# Patient Record
Sex: FEMALE | Race: WHITE | NOT HISPANIC OR LATINO | Employment: FULL TIME | URBAN - NONMETROPOLITAN AREA
[De-identification: names, ages, dates, MRNs, and addresses within clinical notes are randomized per-mention and may not be internally consistent; named-entity substitution may affect disease eponyms.]

---

## 2017-04-19 ENCOUNTER — OFFICE VISIT (OUTPATIENT)
Dept: OBSTETRICS AND GYNECOLOGY | Facility: CLINIC | Age: 57
End: 2017-04-19

## 2017-04-19 VITALS
WEIGHT: 142 LBS | SYSTOLIC BLOOD PRESSURE: 102 MMHG | HEIGHT: 63 IN | DIASTOLIC BLOOD PRESSURE: 70 MMHG | BODY MASS INDEX: 25.16 KG/M2

## 2017-04-19 DIAGNOSIS — Z01.419 VISIT FOR GYNECOLOGIC EXAMINATION: Primary | ICD-10-CM

## 2017-04-19 DIAGNOSIS — E55.9 VITAMIN D DEFICIENCY: ICD-10-CM

## 2017-04-19 DIAGNOSIS — Z12.39 SCREENING FOR BREAST CANCER: ICD-10-CM

## 2017-04-19 DIAGNOSIS — N93.8 DUB (DYSFUNCTIONAL UTERINE BLEEDING): ICD-10-CM

## 2017-04-19 PROCEDURE — 99386 PREV VISIT NEW AGE 40-64: CPT | Performed by: NURSE PRACTITIONER

## 2017-04-19 PROCEDURE — G0123 SCREEN CERV/VAG THIN LAYER: HCPCS | Performed by: NURSE PRACTITIONER

## 2017-04-19 NOTE — PATIENT INSTRUCTIONS
Bone Health  Bones protect organs, store calcium, and anchor muscles. Good health habits, such as eating nutritious foods and exercising regularly, are important for maintaining healthy bones. They can also help to prevent a condition that causes bones to lose density and become weak and brittle (osteoporosis).  WHY IS BONE MASS IMPORTANT?  Bone mass refers to the amount of bone tissue that you have. The higher your bone mass, the stronger your bones. An important step toward having healthy bones throughout life is to have strong and dense bones during childhood. A young adult who has a high bone mass is more likely to have a high bone mass later in life. Bone mass at its greatest it is called peak bone mass.  A large decline in bone mass occurs in older adults. In women, it occurs about the time of menopause. During this time, it is important to practice good health habits, because if more bone is lost than what is replaced, the bones will become less healthy and more likely to break (fracture). If you find that you have a low bone mass, you may be able to prevent osteoporosis or further bone loss by changing your diet and lifestyle.  HOW CAN I FIND OUT IF MY BONE MASS IS LOW?  Bone mass can be measured with an X-ray test that is called a bone mineral density (BMD) test. This test is recommended for all women who are age 65 or older. It may also be recommended for men who are age 70 or older, or for people who are more likely to develop osteoporosis due to:  · Having bones that break easily.  · Having a long-term disease that weakens bones, such as kidney disease or rheumatoid arthritis.  · Having menopause earlier than normal.  · Taking medicine that weakens bones, such as steroids, thyroid hormones, or hormone treatment for breast cancer or prostate cancer.  · Smoking.  · Drinking three or more alcoholic drinks each day.  WHAT ARE THE NUTRITIONAL RECOMMENDATIONS FOR HEALTHY BONES?  To have healthy bones, you need  to get enough of the right minerals and vitamins. Most nutrition experts recommend getting these nutrients from the foods that you eat. Nutritional recommendations vary from person to person. Ask your health care provider what is healthy for you. Here are some general guidelines.  Calcium Recommendations  Calcium is the most important (essential) mineral for bone health. Most people can get enough calcium from their diet, but supplements may be recommended for people who are at risk for osteoporosis. Good sources of calcium include:  · Dairy products, such as low-fat or nonfat milk, cheese, and yogurt.  · Dark green leafy vegetables, such as bok terri and broccoli.  · Calcium-fortified foods, such as orange juice, cereal, bread, soy beverages, and tofu products.  · Nuts, such as almonds.  Follow these recommended amounts for daily calcium intake:  · Children, age 1-3: 700 mg.  · Children, age 4-8: 1,000 mg.  · Children, age 9-13: 1,300 mg.  · Teens, age 14-18: 1,300 mg.  · Adults, age 19-50: 1,000 mg.  · Adults, age 51-70:    Men: 1,000 mg.    Women: 1,200 mg.  · Adults, age 71 or older: 1,200 mg.  · Pregnant and breastfeeding females:    Teens: 1,300 mg.    Adults: 1,000 mg.  Vitamin D Recommendations  Vitamin D is the most essential vitamin for bone health. It helps the body to absorb calcium. Sunlight stimulates the skin to make vitamin D, so be sure to get enough sunlight. If you live in a cold climate or you do not get outside often, your health care provider may recommend that you take vitamin D supplements. Good sources of vitamin D in your diet include:  · Egg yolks.  · Saltwater fish.  · Milk and cereal fortified with vitamin D.  Follow these recommended amounts for daily vitamin D intake:  · Children and teens, age 1-18: 600 international units.  · Adults, age 50 or younger: 400-800 international units.  · Adults, age 51 or older: 800-1,000 international units.  Other Nutrients  Other nutrients for bone  health include:  · Phosphorus. This mineral is found in meat, poultry, dairy foods, nuts, and legumes. The recommended daily intake for adult men and adult women is 700 mg.  · Magnesium. This mineral is found in seeds, nuts, dark green vegetables, and legumes. The recommended daily intake for adult men is 400-420 mg. For adult women, it is 310-320 mg.  · Vitamin K. This vitamin is found in green leafy vegetables. The recommended daily intake is 120 mg for adult men and 90 mg for adult women.  WHAT TYPE OF PHYSICAL ACTIVITY IS BEST FOR BUILDING AND MAINTAINING HEALTHY BONES?  Weight-bearing and strength-building activities are important for building and maintaining peak bone mass. Weight-bearing activities cause muscles and bones to work against gravity. Strength-building activities increases muscle strength that supports bones. Weight-bearing and muscle-building activities include:  · Walking and hiking.  · Jogging and running.  · Dancing.  · Gym exercises.  · Lifting weights.  · Tennis and racquetball.  · Climbing stairs.  · Aerobics.  Adults should get at least 30 minutes of moderate physical activity on most days. Children should get at least 60 minutes of moderate physical activity on most days. Ask your health care provide what type of exercise is best for you.  WHERE CAN I FIND MORE INFORMATION?  For more information, check out the following websites:  · National Osteoporosis Foundation: http://nof.org/learn/basics  · National Institutes of Health: http://www.niams.nih.gov/Health_Info/Bone/Bone_Health/bone_health_for_life.asp     This information is not intended to replace advice given to you by your health care provider. Make sure you discuss any questions you have with your health care provider.     Document Released: 03/09/2005 Document Revised: 05/03/2016 Document Reviewed: 12/23/2015  [employer] Patient Education ©2016 [employer]

## 2017-04-19 NOTE — PROGRESS NOTES
Subjective   Kyleigh James is a 56 y.o. female is here today as a self referral.    HPI Comments: I'm here for a pap and physical and to see why I had an episode of irregular bleeding last month.    Gynecologic Exam   The patient's pertinent negatives include no pelvic pain or vaginal discharge. Pertinent negatives include no abdominal pain, back pain, chills, constipation, diarrhea, flank pain, headaches, nausea, rash, sore throat or vomiting.       The following portions of the patient's history were reviewed and updated as appropriate: allergies, current medications, past family history, past medical history, past surgical history and problem list.    Review of Systems   Constitutional: Negative for activity change, appetite change, chills and fatigue.   HENT: Negative for congestion, dental problem, ear pain, hearing loss, nosebleeds, rhinorrhea, sneezing, sore throat and voice change.    Eyes: Negative for discharge, redness, itching and visual disturbance.   Respiratory: Negative for apnea, cough, choking, shortness of breath and wheezing.    Cardiovascular: Negative for chest pain and palpitations.   Gastrointestinal: Negative for abdominal distention, abdominal pain, constipation, diarrhea, nausea and vomiting.   Endocrine: Negative for cold intolerance, heat intolerance and polyuria.   Genitourinary: Positive for vaginal bleeding (one time of irregular bleeding after my period). Negative for difficulty urinating, dyspareunia, flank pain, genital sores, menstrual problem, pelvic pain and vaginal discharge.   Musculoskeletal: Negative for arthralgias, back pain, myalgias and neck pain.   Skin: Negative for pallor and rash.   Allergic/Immunologic: Negative for environmental allergies and food allergies.   Neurological: Negative for dizziness, tremors, seizures, numbness and headaches.   Hematological: Negative for adenopathy. Does not bruise/bleed easily.   Psychiatric/Behavioral: Negative for agitation,  decreased concentration, sleep disturbance and suicidal ideas. The patient is not nervous/anxious.        Objective   Physical Exam   Constitutional: She is oriented to person, place, and time. She appears well-developed and well-nourished. No distress.   HENT:   Head: Normocephalic and atraumatic.   Right Ear: External ear normal.   Left Ear: External ear normal.   Nose: Nose normal.   Mouth/Throat: Oropharynx is clear and moist.   Eyes: EOM are normal. Pupils are equal, round, and reactive to light.   Neck: Normal range of motion. No thyromegaly present.   Cardiovascular: Normal rate, regular rhythm and normal heart sounds.    No murmur heard.  Pulmonary/Chest: Effort normal and breath sounds normal. Bradypneic: bila. No respiratory distress. She has no wheezes. Right breast exhibits no inverted nipple and no mass. Left breast exhibits no inverted nipple and no mass (b). Breasts are asymmetrical.   Bilateral implants, asymmetrical Lt and Rt breasts due to implants encapsulated     Abdominal: Soft. Bowel sounds are normal. There is no tenderness.   Genitourinary: Vagina normal and uterus normal. No breast tenderness. Pelvic exam was performed with patient supine. There is no rash or tenderness on the right labia. There is no rash or tenderness on the left labia. Cervix exhibits no motion tenderness. Right adnexum displays no mass and no tenderness. Left adnexum displays no mass and no tenderness. No bleeding in the vagina. No vaginal discharge found.   Genitourinary Comments: Urethra and urethral meatus normal  Bladder normal no prolapse  Perineum and rectum examined and intact without lesions   Musculoskeletal: Normal range of motion.   Lymphadenopathy:        Right: No inguinal adenopathy present.        Left: No inguinal adenopathy present.   Neurological: She is alert and oriented to person, place, and time. She has normal reflexes.   Skin: Skin is warm and dry.   Psychiatric: She has a normal mood and affect.  Her behavior is normal. Judgment and thought content normal.   Nursing note and vitals reviewed.        Assessment/Plan   Kyleigh was seen today for gynecologic exam.    Diagnoses and all orders for this visit:    Visit for gynecologic examination     Normal gyn exam. Pt does not have a PCP and she does understand if any of the labs are abnormal we will help her find one  -     Liquid-based Pap Smear, Screening  -     T4, Free  -     Hemoglobin A1c  -     CBC & Differential  -     Comprehensive Metabolic Panel  -     Lipid Panel With LDL / HDL Ratio  -     TSH    Screening for breast cancer     Pt has not had one in many years. Bilateral implants encapsulated.   -     Mammo Screening Digital Tomosynthesis Bilateral With CAD; Future    DUB (dysfunctional uterine bleeding)    Pt still has regular periods every 28 days. She had one episode of irregular bleeding after her LMP  Will get an u/s to evaluate     Vitamin D deficiency    Pt had a previous deficiency a long time ago.    -     Vitamin D 25 Hydroxy    BMI 25.0-25.9,adult     Pt does not exercise but does watch what she eats.

## 2017-04-21 LAB
GEN CATEG CVX/VAG CYTO-IMP: NORMAL
LAB AP CASE REPORT: NORMAL
LAB AP GYN ADDITIONAL INFORMATION: NORMAL
LAB AP GYN OTHER FINDINGS: NORMAL
Lab: NORMAL
PATH INTERP SPEC-IMP: NORMAL
STAT OF ADQ CVX/VAG CYTO-IMP: NORMAL

## 2017-04-24 ENCOUNTER — TELEPHONE (OUTPATIENT)
Dept: OBSTETRICS AND GYNECOLOGY | Facility: CLINIC | Age: 57
End: 2017-04-24

## 2017-04-24 NOTE — TELEPHONE ENCOUNTER
----- Message from Mary R Carrell, APRN sent at 4/21/2017  5:39 PM CDT -----  Notify pt of her normal pap smear      Left message on patients voice mail to return my call  PP

## 2017-04-25 ENCOUNTER — PROCEDURE VISIT (OUTPATIENT)
Dept: OBSTETRICS AND GYNECOLOGY | Facility: CLINIC | Age: 57
End: 2017-04-25

## 2017-04-25 ENCOUNTER — TELEPHONE (OUTPATIENT)
Dept: OBSTETRICS AND GYNECOLOGY | Facility: CLINIC | Age: 57
End: 2017-04-25

## 2017-04-25 DIAGNOSIS — N93.8 DUB (DYSFUNCTIONAL UTERINE BLEEDING): Primary | ICD-10-CM

## 2017-04-25 PROCEDURE — 76830 TRANSVAGINAL US NON-OB: CPT | Performed by: OBSTETRICS & GYNECOLOGY

## 2017-05-12 LAB
25(OH)D3+25(OH)D2 SERPL-MCNC: 31.7 NG/ML (ref 30–100)
ALBUMIN SERPL-MCNC: 4.1 G/DL (ref 3.5–5)
ALBUMIN/GLOB SERPL: 1.5 G/DL (ref 1.1–2.5)
ALP SERPL-CCNC: 50 U/L (ref 24–120)
ALT SERPL-CCNC: 30 U/L (ref 0–54)
AST SERPL-CCNC: 32 U/L (ref 7–45)
BASOPHILS # BLD AUTO: 0.01 10*3/MM3 (ref 0–0.2)
BASOPHILS NFR BLD AUTO: 0.3 % (ref 0–2)
BILIRUB SERPL-MCNC: 0.5 MG/DL (ref 0.1–1)
BUN SERPL-MCNC: 11 MG/DL (ref 5–21)
BUN/CREAT SERPL: 14.9 (ref 7–25)
CALCIUM SERPL-MCNC: 9.6 MG/DL (ref 8.4–10.4)
CHLORIDE SERPL-SCNC: 102 MMOL/L (ref 98–110)
CHOLEST SERPL-MCNC: 181 MG/DL (ref 130–200)
CO2 SERPL-SCNC: 28 MMOL/L (ref 24–31)
CREAT SERPL-MCNC: 0.74 MG/DL (ref 0.5–1.4)
EOSINOPHIL # BLD AUTO: 0.08 10*3/MM3 (ref 0–0.7)
EOSINOPHIL NFR BLD AUTO: 2.1 % (ref 0–4)
ERYTHROCYTE [DISTWIDTH] IN BLOOD BY AUTOMATED COUNT: 14.7 % (ref 12–15)
GLOBULIN SER CALC-MCNC: 2.8 GM/DL
GLUCOSE SERPL-MCNC: 78 MG/DL (ref 70–100)
HBA1C MFR BLD: 5.1 %
HCT VFR BLD AUTO: 39.3 % (ref 37–47)
HDLC SERPL-MCNC: 72 MG/DL
HGB BLD-MCNC: 13.1 G/DL (ref 12–16)
IMM GRANULOCYTES # BLD: 0.01 10*3/MM3 (ref 0–0.03)
IMM GRANULOCYTES NFR BLD: 0.3 % (ref 0–5)
LDLC SERPL CALC-MCNC: 99 MG/DL (ref 0–99)
LDLC/HDLC SERPL: 1.37 {RATIO}
LYMPHOCYTES # BLD AUTO: 1.02 10*3/MM3 (ref 0.72–4.86)
LYMPHOCYTES NFR BLD AUTO: 27.1 % (ref 15–45)
MCH RBC QN AUTO: 28.7 PG (ref 28–32)
MCHC RBC AUTO-ENTMCNC: 33.3 G/DL (ref 33–36)
MCV RBC AUTO: 86 FL (ref 82–98)
MONOCYTES # BLD AUTO: 0.45 10*3/MM3 (ref 0.19–1.3)
MONOCYTES NFR BLD AUTO: 12 % (ref 4–12)
NEUTROPHILS # BLD AUTO: 2.19 10*3/MM3 (ref 1.87–8.4)
NEUTROPHILS NFR BLD AUTO: 58.2 % (ref 39–78)
PLATELET # BLD AUTO: 214 10*3/MM3 (ref 130–400)
POTASSIUM SERPL-SCNC: 5.2 MMOL/L (ref 3.5–5.3)
PROT SERPL-MCNC: 6.9 G/DL (ref 6.3–8.7)
RBC # BLD AUTO: 4.57 10*6/MM3 (ref 4.2–5.4)
SODIUM SERPL-SCNC: 141 MMOL/L (ref 135–145)
T4 FREE SERPL-MCNC: 1.17 NG/DL (ref 0.78–2.19)
TRIGL SERPL-MCNC: 51 MG/DL (ref 0–149)
TSH SERPL DL<=0.005 MIU/L-ACNC: 2 MIU/ML (ref 0.47–4.68)
VLDLC SERPL CALC-MCNC: 10.2 MG/DL
WBC # BLD AUTO: 3.76 10*3/MM3 (ref 4.8–10.8)

## 2017-05-18 ENCOUNTER — HOSPITAL ENCOUNTER (OUTPATIENT)
Dept: MAMMOGRAPHY | Facility: HOSPITAL | Age: 57
Discharge: HOME | End: 2017-05-18
Admitting: NURSE PRACTITIONER

## 2017-05-18 DIAGNOSIS — Z12.39 SCREENING FOR BREAST CANCER: ICD-10-CM

## 2017-05-18 PROCEDURE — 77063 BREAST TOMOSYNTHESIS BI: CPT

## 2017-05-18 PROCEDURE — G0202 SCR MAMMO BI INCL CAD: HCPCS

## 2017-06-08 ENCOUNTER — OFFICE VISIT (OUTPATIENT)
Dept: OBSTETRICS AND GYNECOLOGY | Facility: CLINIC | Age: 57
End: 2017-06-08

## 2017-06-08 VITALS
SYSTOLIC BLOOD PRESSURE: 136 MMHG | WEIGHT: 129 LBS | BODY MASS INDEX: 22.86 KG/M2 | HEIGHT: 63 IN | DIASTOLIC BLOOD PRESSURE: 66 MMHG

## 2017-06-08 DIAGNOSIS — N97.0 ANOVULATORY (DYSFUNCTIONAL UTERINE) BLEEDING: Primary | ICD-10-CM

## 2017-06-08 PROCEDURE — 99213 OFFICE O/P EST LOW 20 MIN: CPT | Performed by: OBSTETRICS & GYNECOLOGY

## 2017-06-08 NOTE — PROGRESS NOTES
"Kyleigh James is a 56 y.o. female here today for evaluation of perimenopausal irregular bleeding.  She reports normal regular menstrual cycles until March.  She had a menstrual cycle the end of March, and then began bleeding 2 weeks later.  That bleeding episode lasted 4 days and then she did not have any bleeding for 6 weeks.  Her last normal menstrual period was June 1 and just ended.  It lasted about I days and was normal for her.  She denies any abdominal pain or nausea.  She has not had any hot flashes or night sweats.  She had an ultrasound in April which showed a normal size uterus with a couple of fibroids measuring about 1 cm. Her TSH and CBC in May were normal. Her Pap smear in April was normal.    Visit Vitals   • /66 (BP Location: Right arm, Patient Position: Sitting, Cuff Size: Adult)   • Ht 63\" (160 cm)   • Wt 129 lb (58.5 kg)   • LMP 06/01/2017 (Exact Date)   • Breastfeeding No   • BMI 22.85 kg/m2     Pleasant female no acute distress  Skin without pallor  Insight and judgment normal    Assessment: Perimenopausal dysfunctional bleeding    We discussed the normal menopausal transition and the role of ovulation in regular cycles.  I do not see any concerning findings on her ultrasound and her lab work is normal.  We discussed treatment options including expectant management or using cyclic Provera to ensure regularity of her cycle.  She would like to proceed with expectant management and she will call the office if she develops any persistent heavy bleeding.      "

## 2018-07-26 ENCOUNTER — OFFICE VISIT (OUTPATIENT)
Dept: RETAIL CLINIC | Facility: CLINIC | Age: 58
End: 2018-07-26

## 2018-07-26 VITALS
DIASTOLIC BLOOD PRESSURE: 68 MMHG | OXYGEN SATURATION: 98 % | SYSTOLIC BLOOD PRESSURE: 128 MMHG | HEIGHT: 64 IN | HEART RATE: 86 BPM | BODY MASS INDEX: 22.4 KG/M2 | WEIGHT: 131.2 LBS | TEMPERATURE: 98.3 F | RESPIRATION RATE: 18 BRPM

## 2018-07-26 DIAGNOSIS — L08.9 WOUND INFECTION: Primary | ICD-10-CM

## 2018-07-26 DIAGNOSIS — T14.8XXA WOUND INFECTION: Primary | ICD-10-CM

## 2018-07-26 PROCEDURE — 99213 OFFICE O/P EST LOW 20 MIN: CPT | Performed by: NURSE PRACTITIONER

## 2018-07-26 RX ORDER — CLINDAMYCIN HYDROCHLORIDE 300 MG/1
300 CAPSULE ORAL 3 TIMES DAILY
Qty: 30 CAPSULE | Refills: 0 | Status: SHIPPED | OUTPATIENT
Start: 2018-07-26 | End: 2018-08-05

## 2018-07-26 NOTE — PROGRESS NOTES
"  Chief Complaint   Patient presents with   • Wound Infection     Subjective   Kyleigh James is a 57 y.o. female who presents to the clinic today with complaints of wound infection. She hit her right lower leg with the door to her  eight days ago. The wound has been slow to heal and now has redness and is more painful. She has been applying polysporin. She has also used peroxide and betadine.   HPI      Current Outpatient Prescriptions:   •  Multiple Vitamins-Minerals (MULTIVITAMIN ADULT PO), Take 1 tablet by mouth Daily., Disp: , Rfl:     Allergies:  Penicillins    History reviewed. No pertinent past medical history.  Past Surgical History:   Procedure Laterality Date   • AUGMENTATION MAMMAPLASTY Bilateral 30 yrs   • EYE SURGERY       Family History   Problem Relation Age of Onset   • Cancer Mother    • Breast cancer Mother    • Diabetes Maternal Grandmother      Social History   Substance Use Topics   • Smoking status: Never Smoker   • Smokeless tobacco: Never Used   • Alcohol use No       Review of Systems  Review of Systems   Constitutional: Negative for chills, fatigue and fever.   Respiratory: Negative for cough and shortness of breath.    Cardiovascular: Positive for leg swelling (chronic off and on due to \"poor circulation\").   Musculoskeletal: Negative.    Skin: Positive for wound.       Objective   /68 (BP Location: Left arm, Patient Position: Sitting, Cuff Size: Adult)   Pulse 86   Temp 98.3 °F (36.8 °C) (Oral)   Resp 18   Ht 162.6 cm (64\")   Wt 59.5 kg (131 lb 3.2 oz)   LMP 07/26/2018   SpO2 98%   BMI 22.52 kg/m²       Physical Exam   Constitutional: She is oriented to person, place, and time. She appears well-developed and well-nourished. No distress.   Cardiovascular: Normal rate, regular rhythm and normal heart sounds.    Pulmonary/Chest: Effort normal and breath sounds normal.   Neurological: She is alert and oriented to person, place, and time.   Skin: Skin is warm and dry. "        Psychiatric: She has a normal mood and affect.     Wound cleansed with commercial wound cleanser. Triple antibacterial ointment and bandaid applied.    Assessment/Plan     Kyleigh was seen today for wound infection.    Diagnoses and all orders for this visit:    Wound infection    Other orders  -     clindamycin (CLEOCIN) 300 MG capsule; Take 1 capsule by mouth 3 (Three) Times a Day for 10 days.      Wash with soap and water. Pat dry and apply triple antibiotic ointment. Do not use peroxide or betadine.  Cover with bandage.  Elevate leg when possible.     Monitor wound.  If no improvement after three days please follow up with your primary care provider or at urgent care.  If worse please be seen sooner.

## 2018-07-26 NOTE — PATIENT INSTRUCTIONS
Wound Infection  A wound infection happens when germs start to grow in the wound. Germs that cause wound infections are most commonly bacteria. Other types of infections can occur as well. In some cases, infection can cause the wound to break open. Wound infections need treatment. If a wound infection is left untreated, complications can occur. This may include an infection in your bloodstream (sepsis) or in a bone (osteomyelitis).  What are the causes?  This condition is caused by germs growing in the wound.  What increases the risk?  The following factors may make you more likely to develop this condition:  · Having a weak body defense system (immune system).  · Having diabetes.  · Taking steroid medicines for a long time (chronic use).  · Smoking.  · Older age.  · Being overweight.    What are the signs or symptoms?  Symptoms of this condition include:  · Having more redness, swelling, or pain at the wound site.  · Having more blood, pus, or fluid at the wound site.  · A bad smell coming from a wound or bandage (dressing).  · Having a fever.  · Feeling tired or fatigued.    How is this diagnosed?  This condition is diagnosed with a medical history and physical exam. You may also have blood tests.  How is this treated?  This condition is treated with an antibiotic medicine. The infection should improve 24-48 hours after you start antibiotics. Any redness around the wound should stop spreading, and the wound should be less painful.  Follow these instructions at home:  Medicines  · Take or apply over-the-counter and prescription medicines only as told by your health care provider.  · If you were prescribed antibiotic medicine, take or apply it as told by your health care provider. Do not stop using the antibiotic even if your condition improves.  Wound care  · Clean the wound each day or as told by your health care provider.  ? Wash the wound with mild soap and water.  ? Rinse the wound with water to remove all  soap.  ? Pat the wound dry with a clean towel. Do not rub it.  · Follow instructions from your health care provider about how to take care of your wound. Make sure you:  ? Wash your hands with soap and water before you change your dressing. If soap and water are not available, use hand .  ? Change your dressing as told by your health care provider.  ? Leave stitches (sutures), skin glue, or adhesive strips in place, if this applies. These skin closures may need to stay in place for 2 weeks or longer. If adhesive strip edges start to loosen and curl up, you may trim the loose edges. Do not remove adhesive strips completely unless your health care provider tells you to do that. Some wounds are left open to heal on their own.  · Check your wound every day for signs of infection. Watch for:  ? More redness, swelling, or pain.  ? More fluid or blood.  ? Warmth.  ? Pus or a bad smell.  General instructions  · Keep the dressing dry until your health care provider says it can be removed.  · Do not take baths, swim, use a hot tub, or do anything that would put your wound underwater until your health care provider approves.  · Raise (elevate) the injured area above the level of your heart while you are sitting or lying down.  · Do not scratch or pick at the wound.  · Keep all follow-up visits as told by your health care provider. This is important.  Contact a health care provider if:  · Your pain is not controlled with medicine.  · You have more redness, swelling, or pain around your wound.  · You have more fluid or blood coming from your wound.  · Your wound feels warm to the touch.  · You have pus coming from your wound.  · You continue to notice a bad smell coming from your wound or your dressing.  · Your wound that was closed breaks open.  Get help right away if:  · You have a red streak going away from your wound.  · You have a fever.  This information is not intended to replace advice given to you by your  health care provider. Make sure you discuss any questions you have with your health care provider.  Document Released: 09/15/2004 Document Revised: 05/31/2017 Document Reviewed: 06/06/2016  [employer] Interactive Patient Education © 2018 [employer]    Wash with soap and water. Pat dry and apply triple antibiotic ointment. Do not use peroxide or betadine.  Cover with bandage.  Elevate leg when possible.     Monitor wound.  If no improvement after three days please follow up with your primary care provider or at urgent care.  If worse please be seen sooner.

## 2018-07-30 ENCOUNTER — DOCUMENTATION (OUTPATIENT)
Dept: RETAIL CLINIC | Facility: CLINIC | Age: 58
End: 2018-07-30

## 2018-07-30 NOTE — PROGRESS NOTES
She stopped by while at [employer] because she felt area of redness around right shin wound was worse today.  Has been on clindamycin for 4 days.  No fever. Wound looks moist, and she has irritation from bandage.  There is minimal erythema around two small (less than 1/2 cm) wounds. She showed me pictures of original wound/injury, and the scraped areas look much smaller, as does the area of erythema. I told her to continue to cleanse with soap and water, pat dry, and leave open to air as much as possible.  No more ointment.  Daniel erythema with ballpoint pen, and if it has spread tomorrow or if fever develops, she needs to go to UC or ED.  Otherwise, F/U in 3 days, at which point she will have been on clindamycin for 7 days.

## 2018-11-19 ENCOUNTER — TRANSCRIBE ORDERS (OUTPATIENT)
Dept: ADMINISTRATIVE | Facility: HOSPITAL | Age: 58
End: 2018-11-19

## 2018-11-19 DIAGNOSIS — I73.00 RAYNAUD'S SYNDROME WITHOUT GANGRENE: ICD-10-CM

## 2018-11-19 DIAGNOSIS — I87.2 VENOUS INSUFFICIENCY (CHRONIC) (PERIPHERAL): Primary | ICD-10-CM

## 2018-11-19 DIAGNOSIS — I10 ESSENTIAL (PRIMARY) HYPERTENSION: ICD-10-CM

## 2019-01-16 ENCOUNTER — HOSPITAL ENCOUNTER (OUTPATIENT)
Dept: ULTRASOUND IMAGING | Facility: HOSPITAL | Age: 59
Discharge: HOME | End: 2019-01-16
Admitting: PHYSICIAN ASSISTANT

## 2019-01-16 DIAGNOSIS — I10 ESSENTIAL (PRIMARY) HYPERTENSION: ICD-10-CM

## 2019-01-16 DIAGNOSIS — I73.00 RAYNAUD'S SYNDROME WITHOUT GANGRENE: ICD-10-CM

## 2019-01-16 DIAGNOSIS — I87.2 VENOUS INSUFFICIENCY (CHRONIC) (PERIPHERAL): ICD-10-CM

## 2019-01-16 PROCEDURE — 93923 UPR/LXTR ART STDY 3+ LVLS: CPT

## 2019-01-16 PROCEDURE — 93925 LOWER EXTREMITY STUDY: CPT | Performed by: SURGERY

## 2020-02-27 ENCOUNTER — OFFICE VISIT (OUTPATIENT)
Dept: GASTROENTEROLOGY | Facility: CLINIC | Age: 60
End: 2020-02-27

## 2020-02-27 VITALS
OXYGEN SATURATION: 100 % | HEART RATE: 65 BPM | HEIGHT: 63 IN | WEIGHT: 122 LBS | TEMPERATURE: 97 F | DIASTOLIC BLOOD PRESSURE: 80 MMHG | SYSTOLIC BLOOD PRESSURE: 130 MMHG | BODY MASS INDEX: 21.62 KG/M2

## 2020-02-27 DIAGNOSIS — Z12.11 ENCOUNTER FOR SCREENING FOR MALIGNANT NEOPLASM OF COLON: Primary | ICD-10-CM

## 2020-02-27 PROCEDURE — S0285 CNSLT BEFORE SCREEN COLONOSC: HCPCS | Performed by: NURSE PRACTITIONER

## 2020-02-27 RX ORDER — SODIUM PICOSULFATE, MAGNESIUM OXIDE, AND ANHYDROUS CITRIC ACID 10; 3.5; 12 MG/160ML; G/160ML; G/160ML
1 LIQUID ORAL TAKE AS DIRECTED
Qty: 320 ML | Refills: 0 | Status: SHIPPED | OUTPATIENT
Start: 2020-02-27 | End: 2021-05-07

## 2020-02-27 RX ORDER — LANOLIN ALCOHOL/MO/W.PET/CERES
1000 CREAM (GRAM) TOPICAL DAILY
COMMUNITY

## 2020-02-27 NOTE — PROGRESS NOTES
Chief Complaint   Patient presents with   • Colonoscopy     screening colon        PCP: Len Feliciano PA  REFER: Matteo Lara MD    Subjective     HPI    Kyleigh James is a 59 y.o. female who presents to office for preventative maintenance.  There is not  a personal history of colon polyps.  There is not a history of colon cancer.  She does not have complaints of nausea/vomiting, change in bowels, weight loss, no BRBPR, no melena.  There is not a family history of colon cancer.  There is not a family history of colon polyps.  She last colonoscopy-no previous colonoscopy .  Bowels do move on regular basis.      History reviewed. No pertinent past medical history.  Past Surgical History:   Procedure Laterality Date   • AUGMENTATION MAMMAPLASTY Bilateral 30 yrs   • EYE SURGERY       Outpatient Medications Marked as Taking for the 2/27/20 encounter (Office Visit) with Stevan Llanes APRN   Medication Sig Dispense Refill   • Multiple Vitamins-Minerals (HAIR SKIN AND NAILS FORMULA PO) Take  by mouth.     • Multiple Vitamins-Minerals (MULTIVITAMIN ADULT PO) Take 1 tablet by mouth Daily.     • vitamin B-12 (CYANOCOBALAMIN) 1000 MCG tablet Take 1,000 mcg by mouth Daily.       Allergies   Allergen Reactions   • Penicillins Unknown (See Comments)     Since childhood not sure of reaction     Social History     Socioeconomic History   • Marital status:      Spouse name: Not on file   • Number of children: Not on file   • Years of education: Not on file   • Highest education level: Not on file   Tobacco Use   • Smoking status: Never Smoker   • Smokeless tobacco: Never Used   Substance and Sexual Activity   • Alcohol use: No   • Drug use: No   • Sexual activity: Yes     Partners: Male     Comment:  20 years     Family History   Problem Relation Age of Onset   • Cancer Mother    • Breast cancer Mother    • Diabetes Maternal Grandmother    • Colon cancer Neg Hx    • Colon polyps Neg Hx    •  Esophageal cancer Neg Hx      Review of Systems   Constitutional: Negative for fatigue, fever and unexpected weight change.   HENT: Negative for hearing loss, sore throat and voice change.    Eyes: Negative for visual disturbance.   Respiratory: Negative for cough, shortness of breath and wheezing.    Cardiovascular: Negative for chest pain and palpitations.   Gastrointestinal: Negative for abdominal pain, blood in stool and vomiting.   Endocrine: Negative for polydipsia and polyuria.   Genitourinary: Negative for difficulty urinating, dysuria, hematuria and urgency.   Musculoskeletal: Negative for joint swelling and myalgias.   Skin: Negative for color change, rash and wound.   Neurological: Negative for dizziness, tremors, seizures and syncope.   Hematological: Does not bruise/bleed easily.   Psychiatric/Behavioral: Negative for agitation and confusion. The patient is not nervous/anxious.      Objective   Vitals:    02/27/20 1427   BP: 130/80   Pulse: 65   Temp: 97 °F (36.1 °C)   SpO2: 100%     Physical Exam   Constitutional: She is oriented to person, place, and time. She appears well-developed and well-nourished. She is cooperative.   HENT:   Head: Normocephalic and atraumatic.   Eyes: Pupils are equal, round, and reactive to light. Conjunctivae are normal. No scleral icterus.   Neck: Normal range of motion. Neck supple. No JVD present. No thyroid mass and no thyromegaly present.   Cardiovascular: Normal rate, regular rhythm and normal heart sounds. Exam reveals no gallop and no friction rub.   No murmur heard.  Pulmonary/Chest: Effort normal and breath sounds normal. No accessory muscle usage. No respiratory distress. She has no wheezes. She has no rales.   Abdominal: Soft. Normal appearance and bowel sounds are normal. She exhibits no distension, no ascites and no mass. There is no hepatosplenomegaly. There is no tenderness. There is no rebound and no guarding.   Musculoskeletal: Normal range of motion. She  exhibits no edema or tenderness.     Vascular Status -  Her right foot exhibits normal foot vasculature  and no edema. Her left foot exhibits normal foot vasculature  and no edema.  Lymphadenopathy:     She has no cervical adenopathy.   Neurological: She is alert and oriented to person, place, and time. She has normal strength. Gait normal.   Skin: Skin is warm, dry and intact. No rash noted.     Imaging Results (Most Recent)     None        Body mass index is 21.61 kg/m².    Assessment/Plan   Kyleigh was seen today for colonoscopy.    Diagnoses and all orders for this visit:    Encounter for screening for malignant neoplasm of colon  -     Case Request; Standing  -     Case Request    Other orders  -     CLENPIQ 10-3.5-12 MG-GM -GM/160ML solution; Take 1 each by mouth Take As Directed.      COLONOSCOPY WITH ANESTHESIA (N/A)    Advised pt to stop ASA, use of NSAIDs, Fish Oil, and MV 5 days prior to procedure, per Dr Hightower protocol.  Tylenol based products are ok to take.  Pt verbalized understanding.     All risks, benefits, alternatives, and indications of colonoscopy procedure have been discussed with the patient. Risks to include perforation of the colon requiring possible surgery or colostomy, risk of bleeding from biopsies or removal of colon tissue, possibility of missing a colon polyp or cancer, or adverse drug reaction.  Benefits to include the diagnosis and management of disease of the colon and rectum. Alternatives to include barium enema, radiographic evaluation, lab testing or no intervention. She verbalizes understanding and agrees.               Stevan Llanes, APRN  02/27/20        There are no Patient Instructions on file for this visit.

## 2020-03-30 ENCOUNTER — TELEPHONE (OUTPATIENT)
Dept: GASTROENTEROLOGY | Facility: CLINIC | Age: 60
End: 2020-03-30

## 2020-03-30 NOTE — TELEPHONE ENCOUNTER
Spoke with patient - due to COVID 19 that we were going to have to cx pt upcoming EGD/ Colonoscopy procedure with Dr. Hightower.     I told pt once the hosp allows us to resume we would call back and scheduled. They voiced understanding.

## 2020-05-20 ENCOUNTER — TELEPHONE (OUTPATIENT)
Dept: GASTROENTEROLOGY | Facility: CLINIC | Age: 60
End: 2020-05-20

## 2020-05-29 ENCOUNTER — TELEPHONE (OUTPATIENT)
Dept: GASTROENTEROLOGY | Facility: CLINIC | Age: 60
End: 2020-05-29

## 2020-05-29 NOTE — TELEPHONE ENCOUNTER
Called to reschedule procedure     No answer, message left with return number      Mailed letter to patient and sent letter to Len Feliciano PA

## 2020-09-28 PROCEDURE — U0003 INFECTIOUS AGENT DETECTION BY NUCLEIC ACID (DNA OR RNA); SEVERE ACUTE RESPIRATORY SYNDROME CORONAVIRUS 2 (SARS-COV-2) (CORONAVIRUS DISEASE [COVID-19]), AMPLIFIED PROBE TECHNIQUE, MAKING USE OF HIGH THROUGHPUT TECHNOLOGIES AS DESCRIBED BY CMS-2020-01-R: HCPCS | Performed by: FAMILY MEDICINE

## 2021-04-15 ENCOUNTER — OFFICE VISIT (OUTPATIENT)
Dept: OBSTETRICS AND GYNECOLOGY | Facility: CLINIC | Age: 61
End: 2021-04-15

## 2021-04-15 VITALS
WEIGHT: 148 LBS | SYSTOLIC BLOOD PRESSURE: 140 MMHG | DIASTOLIC BLOOD PRESSURE: 76 MMHG | BODY MASS INDEX: 26.22 KG/M2 | HEIGHT: 63 IN

## 2021-04-15 DIAGNOSIS — N95.0 PMB (POSTMENOPAUSAL BLEEDING): Primary | ICD-10-CM

## 2021-04-15 DIAGNOSIS — Z80.3 FAMILY HISTORY OF BREAST CANCER: ICD-10-CM

## 2021-04-15 PROCEDURE — 99213 OFFICE O/P EST LOW 20 MIN: CPT | Performed by: NURSE PRACTITIONER

## 2021-04-15 NOTE — PROGRESS NOTES
Subjective   Kyleigh James is a 60 y.o. female  YOB: 1960      Chief Complaint   Patient presents with   • Vaginal Bleeding     Patient here with c/o PMB that has been ongoing for about 10 days. Patient has not been heavy but has been consistent. Patient states it has been light and has felt like a normal period when it comes to cramping.        Vaginal Bleeding  The patient's primary symptoms include pelvic pain (menstrual like cramping). The patient's pertinent negatives include no vaginal discharge. Pertinent negatives include no abdominal pain, back pain, chills, constipation, diarrhea, dysuria, fever, flank pain, frequency, headaches, hematuria, nausea, rash, sore throat, urgency or vomiting.       The following portions of the patient's history were reviewed and updated as appropriate: allergies, current medications, past family history, past medical history, past social history, past surgical history and problem list.    Allergies   Allergen Reactions   • Penicillins Unknown (See Comments)     Since childhood not sure of reaction       History reviewed. No pertinent past medical history.    Family History   Problem Relation Age of Onset   • Cancer Mother    • Breast cancer Mother    • Diabetes Maternal Grandmother    • Colon cancer Neg Hx    • Colon polyps Neg Hx    • Esophageal cancer Neg Hx    • Ovarian cancer Neg Hx        Social History     Socioeconomic History   • Marital status:      Spouse name: Not on file   • Number of children: Not on file   • Years of education: Not on file   • Highest education level: Not on file   Tobacco Use   • Smoking status: Never Smoker   • Smokeless tobacco: Never Used   Substance and Sexual Activity   • Alcohol use: No   • Drug use: No   • Sexual activity: Yes     Partners: Male     Comment:  20 years         Current Outpatient Medications:   •  Multiple Vitamins-Minerals (HAIR SKIN AND NAILS FORMULA PO), Take  by mouth., Disp: , Rfl:    •  Multiple Vitamins-Minerals (MULTIVITAMIN ADULT PO), Take 1 tablet by mouth Daily., Disp: , Rfl:   •  vitamin B-12 (CYANOCOBALAMIN) 1000 MCG tablet, Take 1,000 mcg by mouth Daily., Disp: , Rfl:   •  CLENPIQ 10-3.5-12 MG-GM -GM/160ML solution, Take 1 each by mouth Take As Directed., Disp: 320 mL, Rfl: 0    Patient's last menstrual period was 03/15/2019 (approximate).    Sexual History:         Could not be calculated    Past Surgical History:   Procedure Laterality Date   • AUGMENTATION MAMMAPLASTY Bilateral 30 yrs   • EYE SURGERY         Review of Systems   Constitutional: Negative for activity change, appetite change, chills, diaphoresis, fatigue, fever and unexpected weight change.   HENT: Negative for congestion, dental problem, drooling, ear discharge, ear pain, facial swelling, hearing loss, mouth sores, nosebleeds, postnasal drip, rhinorrhea, sinus pressure, sinus pain, sneezing, sore throat, tinnitus, trouble swallowing and voice change.    Eyes: Negative for photophobia, pain, discharge, redness, itching and visual disturbance.   Respiratory: Negative for apnea, cough, choking, chest tightness, shortness of breath, wheezing and stridor.    Cardiovascular: Negative for chest pain, palpitations and leg swelling.   Gastrointestinal: Negative for abdominal distention, abdominal pain, anal bleeding, blood in stool, constipation, diarrhea, nausea, rectal pain and vomiting.   Endocrine: Negative for cold intolerance, heat intolerance, polydipsia, polyphagia and polyuria.   Genitourinary: Positive for pelvic pain (menstrual like cramping) and vaginal bleeding. Negative for decreased urine volume, difficulty urinating, dyspareunia, dysuria, enuresis, flank pain, frequency, genital sores, hematuria, menstrual problem, urgency, vaginal discharge and vaginal pain.   Musculoskeletal: Negative for arthralgias, back pain, gait problem, joint swelling, myalgias, neck pain and neck stiffness.   Skin: Negative for color  "change, pallor, rash and wound.   Allergic/Immunologic: Negative for environmental allergies, food allergies and immunocompromised state.   Neurological: Negative for dizziness, tremors, seizures, syncope, facial asymmetry, speech difficulty, weakness, light-headedness, numbness and headaches.   Hematological: Negative for adenopathy. Does not bruise/bleed easily.   Psychiatric/Behavioral: Negative for agitation, behavioral problems, confusion, decreased concentration, dysphoric mood, hallucinations, self-injury, sleep disturbance and suicidal ideas. The patient is not nervous/anxious and is not hyperactive.        Objective   Physical Exam  Vitals and nursing note reviewed.   Constitutional:       Appearance: She is well-developed.   HENT:      Head: Normocephalic.   Eyes:      Pupils: Pupils are equal, round, and reactive to light.   Cardiovascular:      Rate and Rhythm: Normal rate and regular rhythm.   Pulmonary:      Effort: Pulmonary effort is normal.      Breath sounds: Normal breath sounds.   Abdominal:      Palpations: Abdomen is soft.   Musculoskeletal:         General: Normal range of motion.      Cervical back: Normal range of motion.   Skin:     General: Skin is warm and dry.   Neurological:      Mental Status: She is alert and oriented to person, place, and time.   Psychiatric:         Behavior: Behavior normal.           Vitals:    04/15/21 1525   BP: 140/76   Weight: 67.1 kg (148 lb)   Height: 160 cm (63\")       Diagnoses and all orders for this visit:    1. PMB (postmenopausal bleeding) (Primary)  Comments:  Patient here with complaint of postmenopausal bleeding times about 10 days.  States it has not been heavy but it has been consistent.  Menstrual like cramping.  Ultrasound done today shows an endometrial thickness of 17 mm.  Also it showed 2 fibroids.  Discussed this at length with patient.  Appointment scheduled with Dr. Watson to discuss treatment options.    2. Family history of breast " cancer  Comments:  Patient's mother was diagnosed with breast cancer in her 30s.  Discussed genetic testing with patient.  She would like to proceed.  Referral made for genetic testing/counseling.  Orders:  -     Ambulatory Referral to Genetic Counseling/Testing - [facility]  -     [facility]          Patient's Body mass index is 26.22 kg/m². BMI is above normal parameters. Recommendations include: exercise counseling and nutrition counseling.             Non-Smoker    MyChart Instructions Given

## 2021-04-19 DIAGNOSIS — Z83.71 FAMILY HISTORY OF POLYPS IN THE COLON: ICD-10-CM

## 2021-04-19 DIAGNOSIS — Z80.3 FAMILY HISTORY OF BREAST CANCER: Primary | ICD-10-CM

## 2021-04-22 ENCOUNTER — LAB (OUTPATIENT)
Dept: LAB | Facility: HOSPITAL | Age: 61
End: 2021-04-22

## 2021-04-22 PROCEDURE — 36415 COLL VENOUS BLD VENIPUNCTURE: CPT | Performed by: NURSE PRACTITIONER

## 2021-04-22 PROCEDURE — 83002 ASSAY OF GONADOTROPIN (LH): CPT | Performed by: NURSE PRACTITIONER

## 2021-04-22 PROCEDURE — 83001 ASSAY OF GONADOTROPIN (FSH): CPT | Performed by: NURSE PRACTITIONER

## 2021-05-07 ENCOUNTER — TELEPHONE (OUTPATIENT)
Dept: OBSTETRICS AND GYNECOLOGY | Facility: CLINIC | Age: 61
End: 2021-05-07

## 2021-05-07 ENCOUNTER — OFFICE VISIT (OUTPATIENT)
Dept: OBSTETRICS AND GYNECOLOGY | Facility: CLINIC | Age: 61
End: 2021-05-07

## 2021-05-07 VITALS
SYSTOLIC BLOOD PRESSURE: 126 MMHG | DIASTOLIC BLOOD PRESSURE: 70 MMHG | WEIGHT: 148 LBS | BODY MASS INDEX: 26.22 KG/M2 | HEIGHT: 63 IN

## 2021-05-07 DIAGNOSIS — R93.89 THICKENED ENDOMETRIUM: ICD-10-CM

## 2021-05-07 DIAGNOSIS — D25.9 UTERINE LEIOMYOMA, UNSPECIFIED LOCATION: ICD-10-CM

## 2021-05-07 DIAGNOSIS — N95.0 PMB (POSTMENOPAUSAL BLEEDING): Primary | ICD-10-CM

## 2021-05-07 DIAGNOSIS — Z78.9 NONSMOKER: ICD-10-CM

## 2021-05-07 PROCEDURE — 99213 OFFICE O/P EST LOW 20 MIN: CPT | Performed by: OBSTETRICS & GYNECOLOGY

## 2021-05-07 NOTE — H&P
"[facility]  Kyleigh James  : 1960  MRN: [id number]  CSN: [id number]    History and Physical    Subjective   Kyleigh James is a 60 y.o. year old  who presents for consultation about surgery due to PMB.  Patient says bleeding lasted about two weeks, starting light but then becoming \"full flow like a period\".  That was in April, and she has not had any further bleeding since that time.  Patient does recall cramping that just felt like a period.  Endo lining was thick on u/s.    No past medical history on file.  Past Surgical History:   Procedure Laterality Date   • AUGMENTATION MAMMAPLASTY Bilateral 30 yrs   • EYE SURGERY       Social History    Tobacco Use      Smoking status: Never Smoker      Smokeless tobacco: Never Used      Current Outpatient Medications:   •  ciclopirox (PENLAC) 8 % solution, APPLY TO AFFECTED TOENAILS EVERY NIGHT, Disp: , Rfl:   •  econazole nitrate (SPECTAZOLE) 1 % cream, APPLY CREAM TOPICALLY TWICE DAILY TO AFFECTED AREAS ON FEET FOR 3 WEEKS, Disp: , Rfl:   •  Multiple Vitamins-Minerals (HAIR SKIN AND NAILS FORMULA PO), Take  by mouth., Disp: , Rfl:   •  Multiple Vitamins-Minerals (MULTIVITAMIN ADULT PO), Take 1 tablet by mouth Daily., Disp: , Rfl:   •  vitamin B-12 (CYANOCOBALAMIN) 1000 MCG tablet, Take 1,000 mcg by mouth Daily., Disp: , Rfl:     Allergies   Allergen Reactions   • Penicillins Unknown (See Comments)     Since childhood not sure of reaction   • Adhesive Tape Rash       Family History   Problem Relation Age of Onset   • Cancer Mother    • Breast cancer Mother    • Diabetes Maternal Grandmother    • Colon cancer Neg Hx    • Colon polyps Neg Hx    • Esophageal cancer Neg Hx    • Ovarian cancer Neg Hx      Review of Systems   Constitutional: Negative for activity change and unexpected weight change.   Respiratory: Negative for shortness of breath.    Cardiovascular: Negative for chest pain.   Gastrointestinal: Negative for abdominal pain, constipation and " "diarrhea.   Genitourinary: Positive for vaginal bleeding (for about two weeks in April, previously had not had any bleeding since early 2019). Negative for pelvic pain.         Objective   /70   Ht 160 cm (63\")   Wt 67.1 kg (148 lb)   LMP 03/15/2019 (Approximate)   BMI 26.22 kg/m²     Physical Exam   Physical Exam  Vitals and nursing note reviewed.   Constitutional:       General: She is not in acute distress.     Appearance: She is well-developed.   HENT:      Head: Normocephalic and atraumatic.   Neck:      Thyroid: No thyromegaly.   Pulmonary:      Effort: Pulmonary effort is normal.   Abdominal:      General: There is no distension.      Palpations: Abdomen is soft.      Tenderness: There is no abdominal tenderness.   Musculoskeletal:         General: Normal range of motion.      Cervical back: Normal range of motion.   Skin:     General: Skin is warm and dry.   Neurological:      Mental Status: She is alert and oriented to person, place, and time.   Psychiatric:         Behavior: Behavior normal.         Judgment: Judgment normal.         Labs  Lab Results   Component Value Date     05/12/2017    HGB 13.1 05/12/2017    HCT 39.3 05/12/2017    WBC 3.76 (L) 05/12/2017     05/12/2017    K 5.2 05/12/2017     05/12/2017    CO2 28.0 05/12/2017    BUN 11 05/12/2017    CREATININE 0.74 05/12/2017    ALBUMIN 4.10 05/12/2017    CALCIUM 9.6 05/12/2017    AST 32 05/12/2017    ALT 30 05/12/2017    BILITOT 0.5 05/12/2017     Pelvic u/s April 2021:  Indication: Postmenopausal bleeding  No comparison made  Interpretation: The uterus is of normal size, 7.9cm in length.  The endometrium measures 17 mm.  There is a 1.4 cm posterior fibroid that appears to be submucosal.  There is also a 1.4 cm posterior fundal intramural fibroid.  The ovaries appear normal without cysts or masses.  There is no free fluid     Assessment & Plan    Diagnoses and all orders for this visit:    1. PMB (postmenopausal " bleeding) (Primary): Have recommended hysteroscopy with D&C due to thick endo lining.  Procedure described, and post-op restrictions reviewed.  Patient will be called this afternoon with her surgery date.  -     Case Request; Standing  -     CBC and Differential; Future  -     ECG 12 Lead; Future  -     XR Chest 1 View; Future  -     Case Request    2. Thickened endometrium: 17 mm on u/s    3. Uterine leiomyoma, unspecified location: two small fibroids, both <2 cm.  Patient advised that fibroids are typically benign, and that small fibroids are generally asymptomatic, present in 25% of women.    4. BMI 26.0-26.9,adult: normal    5. Nonsmoker    Other orders  -     Follow Anesthesia Guidelines / Protocol; Future  -     Chlorhexidine Skin Prep; Future  -     Follow Anesthesia Guidelines / Protocol; Standing  -     Place sequential compression device; Standing  -     Verify / Perform Chlorhexidine Skin Prep; Standing  -     Type & Screen; Standing  -     Obtain informed consent; Standing  -     Verify NPO Status; Standing        Sita Watson MD  5/7/2021  12:12 CDT

## 2021-05-07 NOTE — PROGRESS NOTES
"[facility]  Kyleigh James  : 1960  MRN: [id number]  CSN: [id number]    History and Physical    Subjective   Kyleigh James is a 60 y.o. year old  who presents for consultation about surgery due to PMB.  Patient says bleeding lasted about two weeks, starting light but then becoming \"full flow like a period\".  That was in April, and she has not had any further bleeding since that time.  Patient does recall cramping that just felt like a period.  Endo lining was thick on u/s.    No past medical history on file.  Past Surgical History:   Procedure Laterality Date   • AUGMENTATION MAMMAPLASTY Bilateral 30 yrs   • EYE SURGERY       Social History    Tobacco Use      Smoking status: Never Smoker      Smokeless tobacco: Never Used      Current Outpatient Medications:   •  ciclopirox (PENLAC) 8 % solution, APPLY TO AFFECTED TOENAILS EVERY NIGHT, Disp: , Rfl:   •  econazole nitrate (SPECTAZOLE) 1 % cream, APPLY CREAM TOPICALLY TWICE DAILY TO AFFECTED AREAS ON FEET FOR 3 WEEKS, Disp: , Rfl:   •  Multiple Vitamins-Minerals (HAIR SKIN AND NAILS FORMULA PO), Take  by mouth., Disp: , Rfl:   •  Multiple Vitamins-Minerals (MULTIVITAMIN ADULT PO), Take 1 tablet by mouth Daily., Disp: , Rfl:   •  vitamin B-12 (CYANOCOBALAMIN) 1000 MCG tablet, Take 1,000 mcg by mouth Daily., Disp: , Rfl:     Allergies   Allergen Reactions   • Penicillins Unknown (See Comments)     Since childhood not sure of reaction   • Adhesive Tape Rash       Family History   Problem Relation Age of Onset   • Cancer Mother    • Breast cancer Mother    • Diabetes Maternal Grandmother    • Colon cancer Neg Hx    • Colon polyps Neg Hx    • Esophageal cancer Neg Hx    • Ovarian cancer Neg Hx      Review of Systems   Constitutional: Negative for activity change and unexpected weight change.   Respiratory: Negative for shortness of breath.    Cardiovascular: Negative for chest pain.   Gastrointestinal: Negative for abdominal pain, constipation and " "diarrhea.   Genitourinary: Positive for vaginal bleeding (for about two weeks in April, previously had not had any bleeding since early 2019). Negative for pelvic pain.         Objective   /70   Ht 160 cm (63\")   Wt 67.1 kg (148 lb)   LMP 03/15/2019 (Approximate)   BMI 26.22 kg/m²     Physical Exam   Physical Exam  Vitals and nursing note reviewed.   Constitutional:       General: She is not in acute distress.     Appearance: She is well-developed.   HENT:      Head: Normocephalic and atraumatic.   Neck:      Thyroid: No thyromegaly.   Pulmonary:      Effort: Pulmonary effort is normal.   Abdominal:      General: There is no distension.      Palpations: Abdomen is soft.      Tenderness: There is no abdominal tenderness.   Musculoskeletal:         General: Normal range of motion.      Cervical back: Normal range of motion.   Skin:     General: Skin is warm and dry.   Neurological:      Mental Status: She is alert and oriented to person, place, and time.   Psychiatric:         Behavior: Behavior normal.         Judgment: Judgment normal.         Labs  Lab Results   Component Value Date     05/12/2017    HGB 13.1 05/12/2017    HCT 39.3 05/12/2017    WBC 3.76 (L) 05/12/2017     05/12/2017    K 5.2 05/12/2017     05/12/2017    CO2 28.0 05/12/2017    BUN 11 05/12/2017    CREATININE 0.74 05/12/2017    ALBUMIN 4.10 05/12/2017    CALCIUM 9.6 05/12/2017    AST 32 05/12/2017    ALT 30 05/12/2017    BILITOT 0.5 05/12/2017     Pelvic u/s April 2021:  Indication: Postmenopausal bleeding  No comparison made  Interpretation: The uterus is of normal size, 7.9cm in length.  The endometrium measures 17 mm.  There is a 1.4 cm posterior fibroid that appears to be submucosal.  There is also a 1.4 cm posterior fundal intramural fibroid.  The ovaries appear normal without cysts or masses.  There is no free fluid     Assessment & Plan    Diagnoses and all orders for this visit:    1. PMB (postmenopausal " bleeding) (Primary): Have recommended hysteroscopy with D&C due to thick endo lining.  Procedure described, and post-op restrictions reviewed.  Patient will be called this afternoon with her surgery date.  -     Case Request; Standing  -     CBC and Differential; Future  -     ECG 12 Lead; Future  -     XR Chest 1 View; Future  -     Case Request    2. Thickened endometrium: 17 mm on u/s    3. Uterine leiomyoma, unspecified location: two small fibroids, both <2 cm.  Patient advised that fibroids are typically benign, and that small fibroids are generally asymptomatic, present in 25% of women.    4. BMI 26.0-26.9,adult: normal    5. Nonsmoker    Other orders  -     Follow Anesthesia Guidelines / Protocol; Future  -     Chlorhexidine Skin Prep; Future  -     Follow Anesthesia Guidelines / Protocol; Standing  -     Place sequential compression device; Standing  -     Verify / Perform Chlorhexidine Skin Prep; Standing  -     Type & Screen; Standing  -     Obtain informed consent; Standing  -     Verify NPO Status; Standing        Sita Watson MD  5/7/2021  12:12 CDT

## 2021-05-10 ENCOUNTER — TELEPHONE (OUTPATIENT)
Dept: OBSTETRICS AND GYNECOLOGY | Facility: CLINIC | Age: 61
End: 2021-05-10

## 2021-05-10 NOTE — TELEPHONE ENCOUNTER
Pt called nurse line req to r/s her preop appt and her D&C. She can be reached @ [phone] ext [phone] today.

## 2021-05-12 ENCOUNTER — TELEPHONE (OUTPATIENT)
Dept: OBSTETRICS AND GYNECOLOGY | Facility: CLINIC | Age: 61
End: 2021-05-12

## 2021-05-12 NOTE — TELEPHONE ENCOUNTER
Pt surgery moved to 6-21-21 at 9:00am and to arrive 7:00am. Pt also scheduled for pre op labs on 6-1-21 @ 12:30pm. Pt sent to scheduling to make pre op visit appointment with Dr echeverria. Pt understood all. Sending copy of surgery instructions to pt in the mail. NADER

## 2021-05-14 ENCOUNTER — APPOINTMENT (OUTPATIENT)
Dept: PREADMISSION TESTING | Facility: HOSPITAL | Age: 61
End: 2021-05-14

## 2021-06-01 ENCOUNTER — HOSPITAL ENCOUNTER (OUTPATIENT)
Dept: GENERAL RADIOLOGY | Facility: HOSPITAL | Age: 61
Discharge: HOME | End: 2021-06-01

## 2021-06-01 ENCOUNTER — TELEPHONE (OUTPATIENT)
Dept: OBSTETRICS AND GYNECOLOGY | Facility: CLINIC | Age: 61
End: 2021-06-01

## 2021-06-01 ENCOUNTER — OFFICE VISIT (OUTPATIENT)
Dept: OBSTETRICS AND GYNECOLOGY | Facility: CLINIC | Age: 61
End: 2021-06-01

## 2021-06-01 ENCOUNTER — PRE-ADMISSION TESTING (OUTPATIENT)
Dept: PREADMISSION TESTING | Facility: HOSPITAL | Age: 61
End: 2021-06-01

## 2021-06-01 VITALS
DIASTOLIC BLOOD PRESSURE: 71 MMHG | HEART RATE: 70 BPM | HEIGHT: 63 IN | OXYGEN SATURATION: 100 % | BODY MASS INDEX: 25.59 KG/M2 | RESPIRATION RATE: 16 BRPM | WEIGHT: 144.4 LBS | SYSTOLIC BLOOD PRESSURE: 128 MMHG

## 2021-06-01 VITALS
DIASTOLIC BLOOD PRESSURE: 84 MMHG | BODY MASS INDEX: 26.13 KG/M2 | WEIGHT: 142 LBS | HEIGHT: 62 IN | SYSTOLIC BLOOD PRESSURE: 122 MMHG

## 2021-06-01 DIAGNOSIS — N95.0 PMB (POSTMENOPAUSAL BLEEDING): Primary | ICD-10-CM

## 2021-06-01 DIAGNOSIS — Z78.9 NONSMOKER: ICD-10-CM

## 2021-06-01 DIAGNOSIS — R91.1 LUNG NODULE: Primary | ICD-10-CM

## 2021-06-01 DIAGNOSIS — N95.0 PMB (POSTMENOPAUSAL BLEEDING): ICD-10-CM

## 2021-06-01 DIAGNOSIS — R93.89 THICKENED ENDOMETRIUM: ICD-10-CM

## 2021-06-01 PROCEDURE — 93010 ELECTROCARDIOGRAM REPORT: CPT | Performed by: INTERNAL MEDICINE

## 2021-06-01 PROCEDURE — 93005 ELECTROCARDIOGRAM TRACING: CPT

## 2021-06-01 PROCEDURE — 99213 OFFICE O/P EST LOW 20 MIN: CPT | Performed by: OBSTETRICS & GYNECOLOGY

## 2021-06-01 PROCEDURE — 85025 COMPLETE CBC W/AUTO DIFF WBC: CPT | Performed by: OBSTETRICS & GYNECOLOGY

## 2021-06-01 PROCEDURE — 71045 X-RAY EXAM CHEST 1 VIEW: CPT

## 2021-06-01 NOTE — PROGRESS NOTES
Attempted to contact pt regarding need for chest CT. Left message for her to return call to office. BS

## 2021-06-01 NOTE — H&P
"[facility]  Kyleigh James  : 1960  MRN: [id number]  CSN: [id number]    History and Physical    Subjective   Kyleigh James is a 60 y.o. year old  who presented 6 weeks ago for consultation about surgery due to PMB.  Patient says bleeding lasted about two weeks, starting light but then becoming \"full flow like a period\".  That was in April, and she has not had any further bleeding since that time.  Patient does recall cramping that just felt like a period.  Endo lining was thick on u/s.  She returns today, for a final pre-op visit.  She has questions about whether it will be ok to return to work the following day.    No past medical history on file.  Past Surgical History:   Procedure Laterality Date   • AUGMENTATION MAMMAPLASTY Bilateral 30 yrs   • EYE SURGERY       Social History    Tobacco Use      Smoking status: Never Smoker      Smokeless tobacco: Never Used      Current Outpatient Medications:   •  ciclopirox (PENLAC) 8 % solution, APPLY TO AFFECTED TOENAILS EVERY NIGHT, Disp: , Rfl:   •  Multiple Vitamins-Minerals (HAIR SKIN AND NAILS FORMULA PO), Take  by mouth., Disp: , Rfl:   •  Multiple Vitamins-Minerals (MULTIVITAMIN ADULT PO), Take 1 tablet by mouth Daily., Disp: , Rfl:   •  vitamin B-12 (CYANOCOBALAMIN) 1000 MCG tablet, Take 1,000 mcg by mouth Daily., Disp: , Rfl:     Allergies   Allergen Reactions   • Penicillins Unknown (See Comments)     Since childhood not sure of reaction   • Adhesive Tape Rash       Family History   Problem Relation Age of Onset   • Cancer Mother    • Breast cancer Mother    • Diabetes Maternal Grandmother    • Colon cancer Neg Hx    • Colon polyps Neg Hx    • Esophageal cancer Neg Hx    • Ovarian cancer Neg Hx      Review of Systems   Constitutional: Negative for activity change and unexpected weight change.   Respiratory: Negative for shortness of breath.    Cardiovascular: Negative for chest pain.   Gastrointestinal: Negative for abdominal pain, " "constipation and diarrhea.   Genitourinary: Positive for vaginal bleeding (for about two weeks in April, previously had not had any bleeding since early 2019). Negative for pelvic pain.         Objective   /84   Ht 157.5 cm (62\")   Wt 64.4 kg (142 lb)   LMP 03/15/2019 (Approximate)   BMI 25.97 kg/m²     Physical Exam   Physical Exam  Vitals and nursing note reviewed.   Constitutional:       General: She is not in acute distress.     Appearance: She is well-developed.   HENT:      Head: Normocephalic and atraumatic.   Neck:      Thyroid: No thyromegaly.   Pulmonary:      Effort: Pulmonary effort is normal.   Abdominal:      General: There is no distension.      Palpations: Abdomen is soft.      Tenderness: There is no abdominal tenderness.   Musculoskeletal:         General: Normal range of motion.      Cervical back: Normal range of motion.   Skin:     General: Skin is warm and dry.   Neurological:      Mental Status: She is alert and oriented to person, place, and time.   Psychiatric:         Behavior: Behavior normal.         Judgment: Judgment normal.         Labs  Lab Results   Component Value Date     06/01/2021    HGB 14.9 06/01/2021    HCT 43.1 06/01/2021    WBC 3.87 06/01/2021     05/12/2017    K 5.2 05/12/2017     05/12/2017    CO2 28.0 05/12/2017    BUN 11 05/12/2017    CREATININE 0.74 05/12/2017    ALBUMIN 4.10 05/12/2017    CALCIUM 9.6 05/12/2017    AST 32 05/12/2017    ALT 30 05/12/2017    BILITOT 0.5 05/12/2017     Pelvic u/s April 2021:  Indication: Postmenopausal bleeding  No comparison made  Interpretation: The uterus is of normal size, 7.9cm in length.  The endometrium measures 17 mm.  There is a 1.4 cm posterior fibroid that appears to be submucosal.  There is also a 1.4 cm posterior fundal intramural fibroid.  The ovaries appear normal without cysts or masses.  There is no free fluid     Assessment & Plan    Diagnoses and all orders for this visit:    1. PMB " (postmenopausal bleeding) (Primary): Have recommended hysteroscopy with D&C due to thick endo lining.  Procedure described, and post-op restrictions reviewed.  Patient with questions about whether or not she is definitely menopausal.  Patient had orders for an FSH and LH back in April, but did not have her blood drawn.  She does recall that her PCP frantz hormones at the beginning of the year, and reported that she was menopausal.  Patient has been advised that, at her age, it would be highly unlikely that her hormones would not be consistent with menopause.  Patient given the chance to have new hormone panel drawn today, but did not want to do so as she felt it was unnecessary.  -     Case Request; Standing  -     CBC and Differential; Future  -     ECG 12 Lead; Future  -     XR Chest 1 View; Future  -     Case Request    2. Thickened endometrium: 17 mm on u/s    3. Uterine leiomyoma, unspecified location: two small fibroids, both <2 cm.  Patient advised that fibroids are typically benign, and that small fibroids are generally asymptomatic, present in 25% of women.    4. BMI 26.0-26.9,adult: normal    5. Nonsmoker    Other orders  -     Follow Anesthesia Guidelines / Protocol; Future  -     Chlorhexidine Skin Prep; Future  -     Follow Anesthesia Guidelines / Protocol; Standing  -     Place sequential compression device; Standing  -     Verify / Perform Chlorhexidine Skin Prep; Standing  -     Type & Screen; Standing  -     Obtain informed consent; Standing  -     Verify NPO Status; Standing        Sita Watson MD  6/1/2021  14:03 CDT

## 2021-06-01 NOTE — PROGRESS NOTES
"[facility]  Kyleigh James  : 1960  MRN: [id number]  CSN: [id number]    History and Physical    Subjective   Kyleigh James is a 60 y.o. year old  who presented 6 weeks ago for consultation about surgery due to PMB.  Patient says bleeding lasted about two weeks, starting light but then becoming \"full flow like a period\".  That was in April, and she has not had any further bleeding since that time.  Patient does recall cramping that just felt like a period.  Endo lining was thick on u/s.  She returns today, for a final pre-op visit.  She has questions about whether it will be ok to return to work the following day.    No past medical history on file.  Past Surgical History:   Procedure Laterality Date   • AUGMENTATION MAMMAPLASTY Bilateral 30 yrs   • EYE SURGERY       Social History    Tobacco Use      Smoking status: Never Smoker      Smokeless tobacco: Never Used      Current Outpatient Medications:   •  ciclopirox (PENLAC) 8 % solution, APPLY TO AFFECTED TOENAILS EVERY NIGHT, Disp: , Rfl:   •  Multiple Vitamins-Minerals (HAIR SKIN AND NAILS FORMULA PO), Take  by mouth., Disp: , Rfl:   •  Multiple Vitamins-Minerals (MULTIVITAMIN ADULT PO), Take 1 tablet by mouth Daily., Disp: , Rfl:   •  vitamin B-12 (CYANOCOBALAMIN) 1000 MCG tablet, Take 1,000 mcg by mouth Daily., Disp: , Rfl:     Allergies   Allergen Reactions   • Penicillins Unknown (See Comments)     Since childhood not sure of reaction   • Adhesive Tape Rash       Family History   Problem Relation Age of Onset   • Cancer Mother    • Breast cancer Mother    • Diabetes Maternal Grandmother    • Colon cancer Neg Hx    • Colon polyps Neg Hx    • Esophageal cancer Neg Hx    • Ovarian cancer Neg Hx      Review of Systems   Constitutional: Negative for activity change and unexpected weight change.   Respiratory: Negative for shortness of breath.    Cardiovascular: Negative for chest pain.   Gastrointestinal: Negative for abdominal pain, " "constipation and diarrhea.   Genitourinary: Positive for vaginal bleeding (for about two weeks in April, previously had not had any bleeding since early 2019). Negative for pelvic pain.         Objective   /84   Ht 157.5 cm (62\")   Wt 64.4 kg (142 lb)   LMP 03/15/2019 (Approximate)   BMI 25.97 kg/m²     Physical Exam   Physical Exam  Vitals and nursing note reviewed.   Constitutional:       General: She is not in acute distress.     Appearance: She is well-developed.   HENT:      Head: Normocephalic and atraumatic.   Neck:      Thyroid: No thyromegaly.   Pulmonary:      Effort: Pulmonary effort is normal.   Abdominal:      General: There is no distension.      Palpations: Abdomen is soft.      Tenderness: There is no abdominal tenderness.   Musculoskeletal:         General: Normal range of motion.      Cervical back: Normal range of motion.   Skin:     General: Skin is warm and dry.   Neurological:      Mental Status: She is alert and oriented to person, place, and time.   Psychiatric:         Behavior: Behavior normal.         Judgment: Judgment normal.         Labs  Lab Results   Component Value Date     06/01/2021    HGB 14.9 06/01/2021    HCT 43.1 06/01/2021    WBC 3.87 06/01/2021     05/12/2017    K 5.2 05/12/2017     05/12/2017    CO2 28.0 05/12/2017    BUN 11 05/12/2017    CREATININE 0.74 05/12/2017    ALBUMIN 4.10 05/12/2017    CALCIUM 9.6 05/12/2017    AST 32 05/12/2017    ALT 30 05/12/2017    BILITOT 0.5 05/12/2017     Pelvic u/s April 2021:  Indication: Postmenopausal bleeding  No comparison made  Interpretation: The uterus is of normal size, 7.9cm in length.  The endometrium measures 17 mm.  There is a 1.4 cm posterior fibroid that appears to be submucosal.  There is also a 1.4 cm posterior fundal intramural fibroid.  The ovaries appear normal without cysts or masses.  There is no free fluid     Assessment & Plan    Diagnoses and all orders for this visit:    1. PMB " (postmenopausal bleeding) (Primary): Have recommended hysteroscopy with D&C due to thick endo lining.  Procedure described, and post-op restrictions reviewed.  Patient with questions about whether or not she is definitely menopausal.  Patient had orders for an FSH and LH back in April, but did not have her blood drawn.  She does recall that her PCP frantz hormones at the beginning of the year, and reported that she was menopausal.  Patient has been advised that, at her age, it would be highly unlikely that her hormones would not be consistent with menopause.  Patient given the chance to have new hormone panel drawn today, but did not want to do so as she felt it was unnecessary.  -     Case Request; Standing  -     CBC and Differential; Future  -     ECG 12 Lead; Future  -     XR Chest 1 View; Future  -     Case Request    2. Thickened endometrium: 17 mm on u/s    3. Uterine leiomyoma, unspecified location: two small fibroids, both <2 cm.  Patient advised that fibroids are typically benign, and that small fibroids are generally asymptomatic, present in 25% of women.    4. BMI 26.0-26.9,adult: normal    5. Nonsmoker    Other orders  -     Follow Anesthesia Guidelines / Protocol; Future  -     Chlorhexidine Skin Prep; Future  -     Follow Anesthesia Guidelines / Protocol; Standing  -     Place sequential compression device; Standing  -     Verify / Perform Chlorhexidine Skin Prep; Standing  -     Type & Screen; Standing  -     Obtain informed consent; Standing  -     Verify NPO Status; Standing        Sita Watson MD  6/1/2021  14:03 CDT

## 2021-06-01 NOTE — DISCHARGE INSTRUCTIONS
DAY OF SURGERY INSTRUCTIONS        YOUR SURGEON: Dr Watson    PROCEDURE: Dilatation and Curettage Hysteroscopy    DATE OF SURGERY: June 21    ARRIVAL TIME: AS DIRECTED BY OFFICE    YOU MAY TAKE THE FOLLOWING MEDICATION(S) THE MORNING OF SURGERY WITH A SIP OF WATER: None    ALL OTHER HOME MEDICATIONS CHECK WITH YOUR DOCTOR                  MANAGING PAIN AFTER SURGERY    We know you are probably wondering what your pain will be like after surgery.  Following surgery it is unrealistic to expect you will not have pain.   Pain is how our bodies let us know that something is wrong or cautions us to be careful.  That said, our goal is to make your pain tolerable.    Methods we may use to treat your pain include (oral or IV medications, PCAs, epidurals, nerve blocks, etc.)   While some procedures require IV pain medications for a short time after surgery, transitioning to pain medications by mouth allows for better management of pain.   Your nurse will encourage you to take oral pain medications whenever possible.  IV medications work almost immediately, but only last a short while.  Taking medications by mouth allows for a more constant level of medication in your blood stream for a longer period of time.      Once your pain is out of control it is harder to get back under control.  It is important you are aware when your next dose of pain medication is due.  If you are admitted, your nurse may write the time of your next dose on the white board in your room to help you remember.      We are interested in your pain and encourage you to inform us about aggravating factors during your visit.   Many times a simple repositioning every few hours can make a big difference.    If your physician says it is okay, do not let your pain prevent you from getting out of bed. Be sure to call your nurse for assistance prior to getting up so you do not fall.      Before surgery, please decide your tolerable pain goal.  These faces help  describe the pain ratings we use on a 0-10 scale.   Be prepared to tell us your goal and whether or not you take pain or anxiety medications at home.      BEFORE YOU COME TO THE HOSPITAL  (Pre-op instructions)  • Do not eat, drink, smoke or chew gum after midnight the night before surgery.  This also includes no mints.  • Morning of surgery take only the medicines you have been instructed with a sip of water unless otherwise instructed  by your physician.  • Do not shave, wear makeup or dark nail polish.  • Remove all jewelry including rings.  • Leave anything you consider valuable at home.  • Leave your suitcase in the car until after your surgery.  • Bring the following with you if applicable:  o Picture ID and insurance, Medicare or Medicaid cards  o Co-pay/deductible required by insurance (cash, check, credit card)  o Copy of advance directive, living will or power-of- documents if not brought to PAT  o CPAP or BIPAP mask and tubing  o Relaxation aids ( book, magazine), etc.  o Hearing aids                                 ON THE DAY OF SURGERY  · On the day of surgery check in at registration located at the main entrance of the hospital.   ? You will be registered and given a beeper with instructions where to wait in the main lobby.  ? When your beeper lights up and vibrates a member of the Outpatient Surgery staff will meet you at the double doors under the stair steps and escort you to your preoperative room.   · You may have cloth compression devices placed on your legs. These help to prevent blood clots and reduce swelling in your legs.  · An IV may be inserted into one of your veins.  · In the operating room, you may be given one or more of the following:  ? A medicine to help you relax (sedative).  ? A medicine to numb the area (local anesthetic).  ? A medicine to make you fall asleep (general anesthetic).  ? A medicine that is injected into an area of your body to numb everything below the  "injection site (regional anesthetic).  · Your surgical site will be marked or identified.  · You may be given an antibiotic through your IV to help prevent infection.  Contact a health care provider if you:  · Develop a fever of more than 100.4°F (38°C) or other feelings of illness during the 48 hours before your surgery.  · Have symptoms that get worse.  Have questions or concerns about your surgery    General Anesthesia/Surgery, Adult  General anesthesia is the use of medicines to make a person \"go to sleep\" (unconscious) for a medical procedure. General anesthesia must be used for certain procedures, and is often recommended for procedures that:  · Last a long time.  · Require you to be still or in an unusual position.  · Are major and can cause blood loss.  The medicines used for general anesthesia are called general anesthetics. As well as making you unconscious for a certain amount of time, these medicines:  · Prevent pain.  · Control your blood pressure.  · Relax your muscles.  Tell a health care provider about:  · Any allergies you have.  · All medicines you are taking, including vitamins, herbs, eye drops, creams, and over-the-counter medicines.  · Any problems you or family members have had with anesthetic medicines.  · Types of anesthetics you have had in the past.  · Any blood disorders you have.  · Any surgeries you have had.  · Any medical conditions you have.  · Any recent upper respiratory, chest, or ear infections.  · Any history of:  ? Heart or lung conditions, such as heart failure, sleep apnea, asthma, or chronic obstructive pulmonary disease (COPD).  ?  service.  ? Depression or anxiety.  · Any tobacco or drug use, including marijuana or alcohol use.  · Whether you are pregnant or may be pregnant.  What are the risks?  Generally, this is a safe procedure. However, problems may occur, including:  · Allergic reaction.  · Lung and heart problems.  · Inhaling food or liquid from the stomach " into the lungs (aspiration).  · Nerve injury.  · Air in the bloodstream, which can lead to stroke.  · Extreme agitation or confusion (delirium) when you wake up from the anesthetic.  · Waking up during your procedure and being unable to move. This is rare.  These problems are more likely to develop if you are having a major surgery or if you have an advanced or serious medical condition. You can prevent some of these complications by answering all of your health care provider's questions thoroughly and by following all instructions before your procedure.  General anesthesia can cause side effects, including:  · Nausea or vomiting.  · A sore throat from the breathing tube.  · Hoarseness.  · Wheezing or coughing.  · Shaking chills.  · Tiredness.  · Body aches.  · Anxiety.  · Sleepiness or drowsiness.  · Confusion or agitation.  RISKS AND COMPLICATIONS OF SURGERY  Your health care provider will discuss possible risks and complications with you before surgery. Common risks and complications include:    · Problems due to the use of anesthetics.  · Blood loss and replacement (does not apply to minor surgical procedures).  · Temporary increase in pain due to surgery.  · Uncorrected pain or problems that the surgery was meant to correct.  · Infection.  · New damage.    What happens before the procedure?    Medicines  Ask your health care provider about:  · Changing or stopping your regular medicines. This is especially important if you are taking diabetes medicines or blood thinners.  · Taking medicines such as aspirin and ibuprofen. These medicines can thin your blood. Do not take these medicines unless your health care provider tells you to take them.  · Taking over-the-counter medicines, vitamins, herbs, and supplements. Do not take these during the week before your procedure unless your health care provider approves them.  General instructions  · Starting 3-6 weeks before the procedure, do not use any products that  contain nicotine or tobacco, such as cigarettes and e-cigarettes. If you need help quitting, ask your health care provider.  · If you brush your teeth on the morning of the procedure, make sure to spit out all of the toothpaste.  · Tell your health care provider if you become ill or develop a cold, cough, or fever.  · If instructed by your health care provider, bring your sleep apnea device with you on the day of your surgery (if applicable).  · Ask your health care provider if you will be going home the same day, the following day, or after a longer hospital stay.  ? Plan to have someone take you home from the hospital or clinic.  ? Plan to have a responsible adult care for you for at least 24 hours after you leave the hospital or clinic. This is important.  What happens during the procedure?  · You will be given anesthetics through both of the following:  ? A mask placed over your nose and mouth.  ? An IV in one of your veins.  · You may receive a medicine to help you relax (sedative).  · After you are unconscious, a breathing tube may be inserted down your throat to help you breathe. This will be removed before you wake up.  · An anesthesia specialist will stay with you throughout your procedure. He or she will:  ? Keep you comfortable and safe by continuing to give you medicines and adjusting the amount of medicine that you get.  ? Monitor your blood pressure, pulse, and oxygen levels to make sure that the anesthetics do not cause any problems.  The procedure may vary among health care providers and hospitals.  What happens after the procedure?  · Your blood pressure, temperature, heart rate, breathing rate, and blood oxygen level will be monitored until the medicines you were given have worn off.  · You will wake up in a recovery area. You may wake up slowly.  · If you feel anxious or agitated, you may be given medicine to help you calm down.  · If you will be going home the same day, your health care provider  may check to make sure you can walk, drink, and urinate.  · Your health care provider will treat any pain or side effects you have before you go home.  · Do not drive for 24 hours if you were given a sedative.  Summary  · General anesthesia is used to keep you still and prevent pain during a procedure.  · It is important to tell your healthcare provider about your medical history and any surgeries you have had, and previous experience with anesthesia.  · Follow your healthcare provider’s instructions about when to stop eating, drinking, or taking certain medicines before your procedure.  · Plan to have someone take you home from the hospital or clinic.  This information is not intended to replace advice given to you by your health care provider. Make sure you discuss any questions you have with your health care provider.  Document Released: 03/26/2009 Document Revised: 08/03/2018 Document Reviewed: 08/03/2018  [employer] Interactive Patient Education © 2019 [employer]      Fall Prevention in Hospitals, Adult  As a hospital patient, your condition and the treatments you receive can increase your risk for falls. Some additional risk factors for falls in a hospital include:  · Being in an unfamiliar environment.  · Being on bed rest.  · Your surgery.  · Taking certain medicines.  · Your tubing requirements, such as intravenous (IV) therapy or catheters.  It is important that you learn how to decrease fall risks while at the hospital. Below are important tips that can help prevent falls.  SAFETY TIPS FOR PREVENTING FALLS  Talk about your risk of falling.  · Ask your health care provider why you are at risk for falling. Is it your medicine, illness, tubing placement, or something else?  · Make a plan with your health care provider to keep you safe from falls.  · Ask your health care provider or pharmacist about side effects of your medicines. Some medicines can make you dizzy or affect your coordination.  Ask for  help.  · Ask for help before getting out of bed. You may need to press your call button.  · Ask for assistance in getting safely to the toilet.  · Ask for a walker or cane to be put at your bedside. Ask that most of the side rails on your bed be placed up before your health care provider leaves the room.  · Ask family or friends to sit with you.  · Ask for things that are out of your reach, such as your glasses, hearing aids, telephone, bedside table, or call button.  Follow these tips to avoid falling:  · Stay lying or seated, rather than standing, while waiting for help.  · Wear rubber-soled slippers or shoes whenever you walk in the hospital.  · Avoid quick, sudden movements.  ¨ Change positions slowly.  ¨ Sit on the side of your bed before standing.  ¨ Stand up slowly and wait before you start to walk.  · Let your health care provider know if there is a spill on the floor.  · Pay careful attention to the medical equipment, electrical cords, and tubes around you.  · When you need help, use your call button by your bed or in the bathroom. Wait for one of your health care providers to help you.  · If you feel dizzy or unsure of your footing, return to bed and wait for assistance.  · Avoid being distracted by the TV, telephone, or another person in your room.  · Do not lean or support yourself on rolling objects, such as IV poles or bedside tables.     This information is not intended to replace advice given to you by your health care provider. Make sure you discuss any questions you have with your health care provider.     Document Released: 12/15/2001 Document Revised: 01/08/2016 Document Reviewed: 08/25/2013  [employer] Interactive Patient Education ©2016 [employer]            PATIENT/FAMILY/RESPONSIBLE PARTY VERBALIZES UNDERSTANDING OF ABOVE EDUCATION.  COPY OF PAIN SCALE GIVEN AND REVIEWED WITH VERBALIZED UNDERSTANDING.

## 2021-06-01 NOTE — PROGRESS NOTES
Pt notified of need for additional imaging of chest. Pt informed that chest CT has been ordered and someone from scheduling would call her to set that up. Pt understood. BS

## 2021-06-01 NOTE — H&P (VIEW-ONLY)
"[facility]  Kyleigh James  : 1960  MRN: [id number]  CSN: [id number]    History and Physical    Subjective   Kyleigh James is a 60 y.o. year old  who presented 6 weeks ago for consultation about surgery due to PMB.  Patient says bleeding lasted about two weeks, starting light but then becoming \"full flow like a period\".  That was in April, and she has not had any further bleeding since that time.  Patient does recall cramping that just felt like a period.  Endo lining was thick on u/s.  She returns today, for a final pre-op visit.  She has questions about whether it will be ok to return to work the following day.    No past medical history on file.  Past Surgical History:   Procedure Laterality Date   • AUGMENTATION MAMMAPLASTY Bilateral 30 yrs   • EYE SURGERY       Social History    Tobacco Use      Smoking status: Never Smoker      Smokeless tobacco: Never Used      Current Outpatient Medications:   •  ciclopirox (PENLAC) 8 % solution, APPLY TO AFFECTED TOENAILS EVERY NIGHT, Disp: , Rfl:   •  Multiple Vitamins-Minerals (HAIR SKIN AND NAILS FORMULA PO), Take  by mouth., Disp: , Rfl:   •  Multiple Vitamins-Minerals (MULTIVITAMIN ADULT PO), Take 1 tablet by mouth Daily., Disp: , Rfl:   •  vitamin B-12 (CYANOCOBALAMIN) 1000 MCG tablet, Take 1,000 mcg by mouth Daily., Disp: , Rfl:     Allergies   Allergen Reactions   • Penicillins Unknown (See Comments)     Since childhood not sure of reaction   • Adhesive Tape Rash       Family History   Problem Relation Age of Onset   • Cancer Mother    • Breast cancer Mother    • Diabetes Maternal Grandmother    • Colon cancer Neg Hx    • Colon polyps Neg Hx    • Esophageal cancer Neg Hx    • Ovarian cancer Neg Hx      Review of Systems   Constitutional: Negative for activity change and unexpected weight change.   Respiratory: Negative for shortness of breath.    Cardiovascular: Negative for chest pain.   Gastrointestinal: Negative for abdominal pain, " "constipation and diarrhea.   Genitourinary: Positive for vaginal bleeding (for about two weeks in April, previously had not had any bleeding since early 2019). Negative for pelvic pain.         Objective   /84   Ht 157.5 cm (62\")   Wt 64.4 kg (142 lb)   LMP 03/15/2019 (Approximate)   BMI 25.97 kg/m²     Physical Exam   Physical Exam  Vitals and nursing note reviewed.   Constitutional:       General: She is not in acute distress.     Appearance: She is well-developed.   HENT:      Head: Normocephalic and atraumatic.   Neck:      Thyroid: No thyromegaly.   Pulmonary:      Effort: Pulmonary effort is normal.   Abdominal:      General: There is no distension.      Palpations: Abdomen is soft.      Tenderness: There is no abdominal tenderness.   Musculoskeletal:         General: Normal range of motion.      Cervical back: Normal range of motion.   Skin:     General: Skin is warm and dry.   Neurological:      Mental Status: She is alert and oriented to person, place, and time.   Psychiatric:         Behavior: Behavior normal.         Judgment: Judgment normal.         Labs  Lab Results   Component Value Date     06/01/2021    HGB 14.9 06/01/2021    HCT 43.1 06/01/2021    WBC 3.87 06/01/2021     05/12/2017    K 5.2 05/12/2017     05/12/2017    CO2 28.0 05/12/2017    BUN 11 05/12/2017    CREATININE 0.74 05/12/2017    ALBUMIN 4.10 05/12/2017    CALCIUM 9.6 05/12/2017    AST 32 05/12/2017    ALT 30 05/12/2017    BILITOT 0.5 05/12/2017     Pelvic u/s April 2021:  Indication: Postmenopausal bleeding  No comparison made  Interpretation: The uterus is of normal size, 7.9cm in length.  The endometrium measures 17 mm.  There is a 1.4 cm posterior fibroid that appears to be submucosal.  There is also a 1.4 cm posterior fundal intramural fibroid.  The ovaries appear normal without cysts or masses.  There is no free fluid     Assessment & Plan    Diagnoses and all orders for this visit:    1. PMB " (postmenopausal bleeding) (Primary): Have recommended hysteroscopy with D&C due to thick endo lining.  Procedure described, and post-op restrictions reviewed.  Patient with questions about whether or not she is definitely menopausal.  Patient had orders for an FSH and LH back in April, but did not have her blood drawn.  She does recall that her PCP frantz hormones at the beginning of the year, and reported that she was menopausal.  Patient has been advised that, at her age, it would be highly unlikely that her hormones would not be consistent with menopause.  Patient given the chance to have new hormone panel drawn today, but did not want to do so as she felt it was unnecessary.  -     Case Request; Standing  -     CBC and Differential; Future  -     ECG 12 Lead; Future  -     XR Chest 1 View; Future  -     Case Request    2. Thickened endometrium: 17 mm on u/s    3. Uterine leiomyoma, unspecified location: two small fibroids, both <2 cm.  Patient advised that fibroids are typically benign, and that small fibroids are generally asymptomatic, present in 25% of women.    4. BMI 26.0-26.9,adult: normal    5. Nonsmoker    Other orders  -     Follow Anesthesia Guidelines / Protocol; Future  -     Chlorhexidine Skin Prep; Future  -     Follow Anesthesia Guidelines / Protocol; Standing  -     Place sequential compression device; Standing  -     Verify / Perform Chlorhexidine Skin Prep; Standing  -     Type & Screen; Standing  -     Obtain informed consent; Standing  -     Verify NPO Status; Standing        Sita Watson MD  6/1/2021  14:03 CDT

## 2021-06-02 LAB
QT INTERVAL: 424 MS
QTC INTERVAL: 454 MS

## 2021-06-11 ENCOUNTER — HOSPITAL ENCOUNTER (OUTPATIENT)
Dept: CT IMAGING | Facility: HOSPITAL | Age: 61
Discharge: HOME | End: 2021-06-11
Admitting: OBSTETRICS & GYNECOLOGY

## 2021-06-11 DIAGNOSIS — R91.1 LUNG NODULE: ICD-10-CM

## 2021-06-11 LAB
CREAT BLDA-MCNC: 0.7 MG/DL (ref 0.6–1.3)
FSH SERPL-ACNC: NORMAL M[IU]/ML
LH SERPL-ACNC: NORMAL M[IU]/ML

## 2021-06-11 PROCEDURE — 82565 ASSAY OF CREATININE: CPT

## 2021-06-11 PROCEDURE — 71260 CT THORAX DX C+: CPT

## 2021-06-11 PROCEDURE — 25010000002 IOPAMIDOL 61 % SOLUTION: Performed by: OBSTETRICS & GYNECOLOGY

## 2021-06-11 RX ADMIN — IOPAMIDOL 100 ML: 612 INJECTION, SOLUTION INTRAVENOUS at 13:18

## 2021-06-15 ENCOUNTER — TELEPHONE (OUTPATIENT)
Dept: OBSTETRICS AND GYNECOLOGY | Facility: CLINIC | Age: 61
End: 2021-06-15

## 2021-06-15 NOTE — TELEPHONE ENCOUNTER
Pt called to confirm D&C scheduled with Dr Watson on 6/21/21. Reviewed patient's chart and she has had her prework done and is now waiting for COVID testing to call and arrange testing prior to surgery. Pt also informed that arrival time for surgery has changed to 930a now. Voiced understanding.

## 2021-06-16 ENCOUNTER — TRANSCRIBE ORDERS (OUTPATIENT)
Dept: LAB | Facility: HOSPITAL | Age: 61
End: 2021-06-16

## 2021-06-16 DIAGNOSIS — Z01.818 PREOPERATIVE TESTING: Primary | ICD-10-CM

## 2021-06-16 NOTE — PROGRESS NOTES
Attempted to notify pt that her chest ct did not indicate malignancy. Left message for her to return call to office. NADER

## 2021-06-18 ENCOUNTER — LAB (OUTPATIENT)
Dept: LAB | Facility: HOSPITAL | Age: 61
End: 2021-06-18

## 2021-06-18 LAB — SARS-COV-2 ORF1AB RESP QL NAA+PROBE: NOT DETECTED

## 2021-06-18 PROCEDURE — U0004 COV-19 TEST NON-CDC HGH THRU: HCPCS | Performed by: OBSTETRICS & GYNECOLOGY

## 2021-06-18 PROCEDURE — C9803 HOPD COVID-19 SPEC COLLECT: HCPCS | Performed by: OBSTETRICS & GYNECOLOGY

## 2021-06-21 ENCOUNTER — ANESTHESIA EVENT (OUTPATIENT)
Dept: PERIOP | Facility: HOSPITAL | Age: 61
End: 2021-06-21

## 2021-06-21 ENCOUNTER — HOSPITAL ENCOUNTER (OUTPATIENT)
Facility: HOSPITAL | Age: 61
Setting detail: HOSPITAL OUTPATIENT SURGERY
Discharge: HOME | End: 2021-06-21
Attending: OBSTETRICS & GYNECOLOGY | Admitting: OBSTETRICS & GYNECOLOGY

## 2021-06-21 ENCOUNTER — ANESTHESIA (OUTPATIENT)
Dept: PERIOP | Facility: HOSPITAL | Age: 61
End: 2021-06-21

## 2021-06-21 VITALS
DIASTOLIC BLOOD PRESSURE: 71 MMHG | HEART RATE: 72 BPM | TEMPERATURE: 97.2 F | RESPIRATION RATE: 18 BRPM | SYSTOLIC BLOOD PRESSURE: 130 MMHG | OXYGEN SATURATION: 95 %

## 2021-06-21 DIAGNOSIS — Z09 S/P GYNECOLOGICAL SURGERY, FOLLOW-UP EXAM: Primary | ICD-10-CM

## 2021-06-21 DIAGNOSIS — N95.0 PMB (POSTMENOPAUSAL BLEEDING): ICD-10-CM

## 2021-06-21 LAB
ABO GROUP BLD: NORMAL
BLD GP AB SCN SERPL QL: NEGATIVE
RH BLD: POSITIVE
T&S EXPIRATION DATE: NORMAL

## 2021-06-21 PROCEDURE — 86850 RBC ANTIBODY SCREEN: CPT | Performed by: OBSTETRICS & GYNECOLOGY

## 2021-06-21 PROCEDURE — 58558 HYSTEROSCOPY BIOPSY: CPT | Performed by: OBSTETRICS & GYNECOLOGY

## 2021-06-21 PROCEDURE — 86901 BLOOD TYPING SEROLOGIC RH(D): CPT | Performed by: OBSTETRICS & GYNECOLOGY

## 2021-06-21 PROCEDURE — 25010000002 MIDAZOLAM PER 1 MG: Performed by: ANESTHESIOLOGY

## 2021-06-21 PROCEDURE — 88305 TISSUE EXAM BY PATHOLOGIST: CPT | Performed by: OBSTETRICS & GYNECOLOGY

## 2021-06-21 PROCEDURE — 25010000002 ONDANSETRON PER 1 MG: Performed by: NURSE ANESTHETIST, CERTIFIED REGISTERED

## 2021-06-21 PROCEDURE — 86900 BLOOD TYPING SEROLOGIC ABO: CPT | Performed by: OBSTETRICS & GYNECOLOGY

## 2021-06-21 PROCEDURE — 25010000002 DEXAMETHASONE PER 1 MG: Performed by: NURSE ANESTHETIST, CERTIFIED REGISTERED

## 2021-06-21 PROCEDURE — 25010000002 PROPOFOL 10 MG/ML EMULSION: Performed by: NURSE ANESTHETIST, CERTIFIED REGISTERED

## 2021-06-21 PROCEDURE — 25010000002 FENTANYL CITRATE (PF) 100 MCG/2ML SOLUTION: Performed by: NURSE ANESTHETIST, CERTIFIED REGISTERED

## 2021-06-21 RX ORDER — MIDAZOLAM HYDROCHLORIDE 1 MG/ML
1 INJECTION INTRAMUSCULAR; INTRAVENOUS
Status: COMPLETED | OUTPATIENT
Start: 2021-06-21 | End: 2021-06-21

## 2021-06-21 RX ORDER — OXYCODONE HYDROCHLORIDE AND ACETAMINOPHEN 5; 325 MG/1; MG/1
1 TABLET ORAL EVERY 6 HOURS PRN
Qty: 6 TABLET | Refills: 0 | Status: SHIPPED | OUTPATIENT
Start: 2021-06-21 | End: 2021-07-08

## 2021-06-21 RX ORDER — SODIUM CHLORIDE 0.9 % (FLUSH) 0.9 %
3 SYRINGE (ML) INJECTION AS NEEDED
Status: DISCONTINUED | OUTPATIENT
Start: 2021-06-21 | End: 2021-06-21 | Stop reason: HOSPADM

## 2021-06-21 RX ORDER — LABETALOL HYDROCHLORIDE 5 MG/ML
5 INJECTION, SOLUTION INTRAVENOUS
Status: DISCONTINUED | OUTPATIENT
Start: 2021-06-21 | End: 2021-06-21 | Stop reason: HOSPADM

## 2021-06-21 RX ORDER — ONDANSETRON 2 MG/ML
4 INJECTION INTRAMUSCULAR; INTRAVENOUS ONCE AS NEEDED
Status: DISCONTINUED | OUTPATIENT
Start: 2021-06-21 | End: 2021-06-21 | Stop reason: HOSPADM

## 2021-06-21 RX ORDER — SODIUM CHLORIDE, SODIUM LACTATE, POTASSIUM CHLORIDE, CALCIUM CHLORIDE 600; 310; 30; 20 MG/100ML; MG/100ML; MG/100ML; MG/100ML
100 INJECTION, SOLUTION INTRAVENOUS CONTINUOUS
Status: DISCONTINUED | OUTPATIENT
Start: 2021-06-21 | End: 2021-06-21 | Stop reason: HOSPADM

## 2021-06-21 RX ORDER — OXYCODONE AND ACETAMINOPHEN 10; 325 MG/1; MG/1
1 TABLET ORAL ONCE AS NEEDED
Status: DISCONTINUED | OUTPATIENT
Start: 2021-06-21 | End: 2021-06-21 | Stop reason: HOSPADM

## 2021-06-21 RX ORDER — SODIUM CHLORIDE 9 MG/ML
INJECTION, SOLUTION INTRAVENOUS AS NEEDED
Status: DISCONTINUED | OUTPATIENT
Start: 2021-06-21 | End: 2021-06-21 | Stop reason: HOSPADM

## 2021-06-21 RX ORDER — LIDOCAINE HYDROCHLORIDE 10 MG/ML
0.5 INJECTION, SOLUTION EPIDURAL; INFILTRATION; INTRACAUDAL; PERINEURAL ONCE AS NEEDED
Status: DISCONTINUED | OUTPATIENT
Start: 2021-06-21 | End: 2021-06-21 | Stop reason: HOSPADM

## 2021-06-21 RX ORDER — SODIUM CHLORIDE 0.9 % (FLUSH) 0.9 %
3-10 SYRINGE (ML) INJECTION AS NEEDED
Status: DISCONTINUED | OUTPATIENT
Start: 2021-06-21 | End: 2021-06-21 | Stop reason: HOSPADM

## 2021-06-21 RX ORDER — FENTANYL CITRATE 50 UG/ML
INJECTION, SOLUTION INTRAMUSCULAR; INTRAVENOUS AS NEEDED
Status: DISCONTINUED | OUTPATIENT
Start: 2021-06-21 | End: 2021-06-21 | Stop reason: SURG

## 2021-06-21 RX ORDER — FENTANYL CITRATE 50 UG/ML
25 INJECTION, SOLUTION INTRAMUSCULAR; INTRAVENOUS
Status: DISCONTINUED | OUTPATIENT
Start: 2021-06-21 | End: 2021-06-21 | Stop reason: HOSPADM

## 2021-06-21 RX ORDER — IBUPROFEN 600 MG/1
600 TABLET ORAL ONCE AS NEEDED
Status: DISCONTINUED | OUTPATIENT
Start: 2021-06-21 | End: 2021-06-21 | Stop reason: HOSPADM

## 2021-06-21 RX ORDER — DEXAMETHASONE SODIUM PHOSPHATE 4 MG/ML
INJECTION, SOLUTION INTRA-ARTICULAR; INTRALESIONAL; INTRAMUSCULAR; INTRAVENOUS; SOFT TISSUE AS NEEDED
Status: DISCONTINUED | OUTPATIENT
Start: 2021-06-21 | End: 2021-06-21 | Stop reason: SURG

## 2021-06-21 RX ORDER — PROPOFOL 10 MG/ML
VIAL (ML) INTRAVENOUS AS NEEDED
Status: DISCONTINUED | OUTPATIENT
Start: 2021-06-21 | End: 2021-06-21 | Stop reason: SURG

## 2021-06-21 RX ORDER — ONDANSETRON 2 MG/ML
INJECTION INTRAMUSCULAR; INTRAVENOUS AS NEEDED
Status: DISCONTINUED | OUTPATIENT
Start: 2021-06-21 | End: 2021-06-21 | Stop reason: SURG

## 2021-06-21 RX ORDER — ACETAMINOPHEN 500 MG
1000 TABLET ORAL ONCE
Status: COMPLETED | OUTPATIENT
Start: 2021-06-21 | End: 2021-06-21

## 2021-06-21 RX ORDER — SODIUM CHLORIDE 0.9 % (FLUSH) 0.9 %
3 SYRINGE (ML) INJECTION EVERY 12 HOURS SCHEDULED
Status: DISCONTINUED | OUTPATIENT
Start: 2021-06-21 | End: 2021-06-21 | Stop reason: HOSPADM

## 2021-06-21 RX ORDER — LIDOCAINE HYDROCHLORIDE 20 MG/ML
INJECTION, SOLUTION EPIDURAL; INFILTRATION; INTRACAUDAL; PERINEURAL AS NEEDED
Status: DISCONTINUED | OUTPATIENT
Start: 2021-06-21 | End: 2021-06-21 | Stop reason: SURG

## 2021-06-21 RX ORDER — FLUMAZENIL 0.1 MG/ML
0.2 INJECTION INTRAVENOUS AS NEEDED
Status: DISCONTINUED | OUTPATIENT
Start: 2021-06-21 | End: 2021-06-21 | Stop reason: HOSPADM

## 2021-06-21 RX ORDER — EPHEDRINE SULFATE 50 MG/ML
INJECTION, SOLUTION INTRAVENOUS AS NEEDED
Status: DISCONTINUED | OUTPATIENT
Start: 2021-06-21 | End: 2021-06-21 | Stop reason: SURG

## 2021-06-21 RX ORDER — OXYCODONE AND ACETAMINOPHEN 7.5; 325 MG/1; MG/1
2 TABLET ORAL EVERY 4 HOURS PRN
Status: DISCONTINUED | OUTPATIENT
Start: 2021-06-21 | End: 2021-06-21 | Stop reason: HOSPADM

## 2021-06-21 RX ORDER — SODIUM CHLORIDE, SODIUM LACTATE, POTASSIUM CHLORIDE, CALCIUM CHLORIDE 600; 310; 30; 20 MG/100ML; MG/100ML; MG/100ML; MG/100ML
1000 INJECTION, SOLUTION INTRAVENOUS CONTINUOUS
Status: DISCONTINUED | OUTPATIENT
Start: 2021-06-21 | End: 2021-06-21 | Stop reason: HOSPADM

## 2021-06-21 RX ORDER — NALOXONE HCL 0.4 MG/ML
0.4 VIAL (ML) INJECTION AS NEEDED
Status: DISCONTINUED | OUTPATIENT
Start: 2021-06-21 | End: 2021-06-21 | Stop reason: HOSPADM

## 2021-06-21 RX ADMIN — FENTANYL CITRATE 100 MCG: 50 INJECTION, SOLUTION INTRAMUSCULAR; INTRAVENOUS at 12:17

## 2021-06-21 RX ADMIN — LIDOCAINE HYDROCHLORIDE 100 MG: 20 INJECTION, SOLUTION EPIDURAL; INFILTRATION; INTRACAUDAL; PERINEURAL at 12:17

## 2021-06-21 RX ADMIN — VASOPRESSIN 1 UNITS: 20 INJECTION INTRAVENOUS at 12:20

## 2021-06-21 RX ADMIN — SODIUM CHLORIDE, POTASSIUM CHLORIDE, SODIUM LACTATE AND CALCIUM CHLORIDE 1000 ML: 600; 310; 30; 20 INJECTION, SOLUTION INTRAVENOUS at 13:13

## 2021-06-21 RX ADMIN — EPHEDRINE SULFATE 25 MG: 50 INJECTION INTRAVENOUS at 12:21

## 2021-06-21 RX ADMIN — DEXAMETHASONE SODIUM PHOSPHATE 4 MG: 4 INJECTION, SOLUTION INTRA-ARTICULAR; INTRALESIONAL; INTRAMUSCULAR; INTRAVENOUS; SOFT TISSUE at 12:42

## 2021-06-21 RX ADMIN — SODIUM CHLORIDE, POTASSIUM CHLORIDE, SODIUM LACTATE AND CALCIUM CHLORIDE 1000 ML: 600; 310; 30; 20 INJECTION, SOLUTION INTRAVENOUS at 09:42

## 2021-06-21 RX ADMIN — MIDAZOLAM 1 MG: 1 INJECTION INTRAMUSCULAR; INTRAVENOUS at 11:28

## 2021-06-21 RX ADMIN — PROPOFOL 150 MG: 10 INJECTION, EMULSION INTRAVENOUS at 12:17

## 2021-06-21 RX ADMIN — ACETAMINOPHEN 1000 MG: 500 TABLET, FILM COATED ORAL at 11:28

## 2021-06-21 RX ADMIN — ONDANSETRON 4 MG: 2 INJECTION INTRAMUSCULAR; INTRAVENOUS at 12:42

## 2021-06-21 RX ADMIN — MIDAZOLAM 1 MG: 1 INJECTION INTRAMUSCULAR; INTRAVENOUS at 11:40

## 2021-06-21 NOTE — ANESTHESIA POSTPROCEDURE EVALUATION
Patient: Kyleigh James    Procedure Summary     Date: 06/21/21 Room / Location: [facility] OR  /  [facility] OR    Anesthesia Start: 1213 Anesthesia Stop: 1252    Procedure: DILATATION AND CURETTAGE HYSTEROSCOPY (N/A Uterus) Diagnosis:       PMB (postmenopausal bleeding)      (PMB (postmenopausal bleeding) [N95.0])    Surgeons: Sita Watson MD Provider: CRISTINA Law CRNA    Anesthesia Type: general ASA Status: 2          Anesthesia Type: general    Vitals  Vitals Value Taken Time   /59 06/21/21 1345   Temp 97.2 °F (36.2 °C) 06/21/21 1345   Pulse 90 06/21/21 1352   Resp 14 06/21/21 1345   SpO2 97 % 06/21/21 1352   Vitals shown include unvalidated device data.        Post Anesthesia Care and Evaluation    Patient location during evaluation: bedside  Patient participation: complete - patient participated  Level of consciousness: awake and awake and alert  Pain score: 0  Pain management: adequate  Airway patency: patent  Anesthetic complications: No anesthetic complications  PONV Status: none  Cardiovascular status: acceptable  Respiratory status: acceptable  Hydration status: acceptable    Comments: Patient discharged according to acceptable Sofia score per RN assessment. See nursing records for further information.     Blood pressure 124/66, pulse 82, temperature 97.2 °F (36.2 °C), temperature source Temporal, resp. rate 18, last menstrual period 03/15/2019, SpO2 98 %, not currently breastfeeding.

## 2021-06-21 NOTE — OP NOTE
[facility]  Kyleigh James  1960  [id number]  [id number]  6/21/2021      Pre-operative Diagnosis: Thickened endometrial lining and Postmenopausal Vaginal Bleeding    Post-operative Diagnosis: Thickened endometrial lining and Postmenopausal Vaginal Bleeding    Operation: Diagnostic Hysterscopy, Endocervical Currettage and Endometrial Currettage    Surgeon: Sita Watson MD, FACOG    Assistants: OR staff    Anesthesia: General endotracheal anesthesia    Findings: Minimal uterine descent, normal cervix.  Large endometrial polyp    Estimated Blood Loss: minimal      Specimens: Endocervical currettings and Endometrial currettings    Complications:  None    Disposition: PACU - hemodynamically stable.      Procedure Details      After consents were obtained the patient was taken to the operating room, where general anesthesia was administered. She was placed in dorsal lithotomy position, with care taken in placement of legs to avoid nerve injury. She is prepped and draped in the usual sterile fashion.  An In-and-out catheter was performed for bladder for decompression. The cervix  was identified and grasped with a single-tooth tenaculum. Please see comments above regarding details of the exam.   Endocervical curettage was performed sharply and specimen sent separately. The cervix was then gradually and gently dilated with serial Urmila dilators to allow introduction of the hysteroscope.   The above findings were noted.  The uterus was grossly normal in size and contour.   Polyp forceps were used to remove the polyp.  Sharp curettage was then performed until a good uterine cry was noted throughout.     The patient tolerated the procedure well. Instrument counts are correct. She was extubated, awakened, and taken to recovery room in stable condition, with instructions for discharge and a script for pain medication.       Sita Watson MD  6/21/2021  12:48 CDT

## 2021-06-21 NOTE — ANESTHESIA PROCEDURE NOTES
Airway  Urgency: elective    Date/Time: 6/21/2021 12:22 PM    General Information and Staff    Patient location during procedure: OR  CRNA: Marielle Ayoub CRNA    Indications and Patient Condition  Indications for airway management: airway protection    Preoxygenated: yes  Mask difficulty assessment: 1 - vent by mask    Final Airway Details  Final airway type: supraglottic airway      Successful airway: I-gel  Size 4    Number of attempts at approach: 1  Assessment: lips, teeth, and gum same as pre-op and atraumatic intubation

## 2021-06-21 NOTE — DISCHARGE INSTRUCTIONS

## 2021-06-21 NOTE — ANESTHESIA POSTPROCEDURE EVALUATION
Patient: Kyleigh James    Procedure Summary     Date: 06/21/21 Room / Location: [facility] OR  /  [facility] OR    Anesthesia Start: 1213 Anesthesia Stop: 1252    Procedure: DILATATION AND CURETTAGE HYSTEROSCOPY (N/A Uterus) Diagnosis:       PMB (postmenopausal bleeding)      (PMB (postmenopausal bleeding) [N95.0])    Surgeons: Sita Watson MD Provider: CRISTINA Law CRNA    Anesthesia Type: general ASA Status: 2          Anesthesia Type: general    Vitals  Vitals Value Taken Time   /59 06/21/21 1345   Temp 97.2 °F (36.2 °C) 06/21/21 1345   Pulse 90 06/21/21 1352   Resp 14 06/21/21 1345   SpO2 97 % 06/21/21 1352   Vitals shown include unvalidated device data.        Post Anesthesia Care and Evaluation    Patient location during evaluation: PACU  Patient participation: complete - patient participated  Level of consciousness: awake and alert  Pain management: adequate  Airway patency: patent  Anesthetic complications: No anesthetic complications    Cardiovascular status: acceptable  Respiratory status: acceptable  Hydration status: acceptable    Comments: Blood pressure 130/71, pulse 72, temperature 97.2 °F (36.2 °C), temperature source Temporal, resp. rate 18, last menstrual period 03/15/2019, SpO2 95 %, not currently breastfeeding.    Pt discharged from PACU based on dorene score >8

## 2021-06-22 LAB
CYTO UR: NORMAL
LAB AP CASE REPORT: NORMAL
PATH REPORT.FINAL DX SPEC: NORMAL
PATH REPORT.GROSS SPEC: NORMAL

## 2021-06-23 ENCOUNTER — TELEPHONE (OUTPATIENT)
Dept: OBSTETRICS AND GYNECOLOGY | Facility: CLINIC | Age: 61
End: 2021-06-23

## 2021-07-08 ENCOUNTER — OFFICE VISIT (OUTPATIENT)
Dept: OBSTETRICS AND GYNECOLOGY | Facility: CLINIC | Age: 61
End: 2021-07-08

## 2021-07-08 VITALS
HEIGHT: 62 IN | DIASTOLIC BLOOD PRESSURE: 80 MMHG | WEIGHT: 128 LBS | BODY MASS INDEX: 23.55 KG/M2 | SYSTOLIC BLOOD PRESSURE: 138 MMHG

## 2021-07-08 DIAGNOSIS — Z09 S/P GYNECOLOGICAL SURGERY, FOLLOW-UP EXAM: Primary | ICD-10-CM

## 2021-07-08 PROCEDURE — 99024 POSTOP FOLLOW-UP VISIT: CPT | Performed by: OBSTETRICS & GYNECOLOGY

## 2021-07-08 NOTE — PROGRESS NOTES
"Subjective   Chief Complaint   Patient presents with   • Post-op     pt here today for 2 week post op D&C hysteroscopy for PMB. pt voices no concerns.      Kyleigh James is a 60 y.o. year old  presenting to be seen for her post-operative visit.  She had a hysteroscopy/D&C 2 weeks ago for PMB.  Currently she reports mild pain and light bleeding - both resolved by the day after surgery.  She is not having any vaginal bleeding.    No Additional Complaints Reported    The following portions of the patient's history were reviewed and updated as appropriate:current medications and allergies    Review of Systems      Objective   /80   Ht 157.5 cm (62\")   Wt 58.1 kg (128 lb)   LMP 03/15/2019 (Approximate)   BMI 23.41 kg/m²     General:  well developed; well nourished  no acute distress   Abdomen: soft, non-tender; no masses   Pelvis: Clinical staff was present for exam     Final Diagnosis   1.  Endometrium, curettings:  Proliferative phase endometrium, negative for hyperplasia and atypia.  Fragments of endometrial polyp.  Stromal breakdown.    2.  Endocervix, curettings:  Ectocervical squamous mucosa, negative for dysplasia.  Benign endocervical glandular mucosa.   Electronically signed by Tawanda Schmidt MD on 2021 at 1208            Assessment   1. Pt is 2 weeks s/p hysteroscopy/D&C  2. Benign pathology reviewed     Plan   1. RTO for further bleeding    No orders of the defined types were placed in this encounter.         This note was electronically signed.    Sita Watson MD  2021  Answers for HPI/ROS submitted by the patient on 2021  Please describe your symptoms.: post op visit  Have you had these symptoms before?: No  How long have you been having these symptoms?: 1-4 days  What is the primary reason for your visit?: Other      "

## 2021-09-06 PROCEDURE — U0004 COV-19 TEST NON-CDC HGH THRU: HCPCS | Performed by: NURSE PRACTITIONER

## 2021-10-07 ENCOUNTER — TRANSCRIBE ORDERS (OUTPATIENT)
Dept: ADMINISTRATIVE | Facility: HOSPITAL | Age: 61
End: 2021-10-07

## 2021-10-07 DIAGNOSIS — R74.01 ELEVATION OF LEVELS OF LIVER TRANSAMINASE LEVELS: Primary | ICD-10-CM

## 2021-10-28 ENCOUNTER — HOSPITAL ENCOUNTER (OUTPATIENT)
Dept: ULTRASOUND IMAGING | Facility: HOSPITAL | Age: 61
Discharge: HOME | End: 2021-10-28
Admitting: FAMILY MEDICINE

## 2021-10-28 DIAGNOSIS — R74.01 ELEVATION OF LEVELS OF LIVER TRANSAMINASE LEVELS: ICD-10-CM

## 2021-10-28 PROCEDURE — 76705 ECHO EXAM OF ABDOMEN: CPT

## 2021-11-02 ENCOUNTER — TRANSCRIBE ORDERS (OUTPATIENT)
Dept: ADMINISTRATIVE | Facility: HOSPITAL | Age: 61
End: 2021-11-02

## 2021-11-02 DIAGNOSIS — Z12.31 ENCOUNTER FOR SCREENING MAMMOGRAM FOR MALIGNANT NEOPLASM OF BREAST: Primary | ICD-10-CM

## 2022-07-06 ENCOUNTER — TELEPHONE (OUTPATIENT)
Dept: GASTROENTEROLOGY | Facility: CLINIC | Age: 62
End: 2022-07-06

## 2022-07-06 NOTE — TELEPHONE ENCOUNTER
Len Feliciano PA    Patient records from provider reviewed  According to medical record Kyleigh James does not have a history of heart disease or lung disease.  According to medical record Kyleigh James is not currently on blood thinner.  According to medical record Kyleigh James is not currently exhibiting abdominal pain, weight loss, hematochezia, melena,  change in bowels, or other symptoms to indicate pt would need to be seen in office.    Will submit order for Kyleigh James to proceed with CScope    Medication will be verified as well as a lack of GI related symptomology when pt is scheduled for procedure.    REFERRAL/PRE-AUTH MRN - SCAN - GASTRO REFERRAL DR CALLOWAY (06/21/2022)    No previous colonoscopy?

## 2022-07-15 NOTE — TELEPHONE ENCOUNTER
Patient states no NEW GI, HEART, or LUNG problems at this time.     We verified insurance, meds, and past medical history.    HX OF colon cancer and colon polyps - NONE    NO PREVIOUS COLONOSCOPY    Patient verbally understood instructions and stated they would have a  with them the day of procedure.    Pharmacy : [facility]    I will mail a copy of instructions and instructed patient to contact me if they have any questions.     Please put in case request.     DATE OF PROCEDURE: 08/26/2022 at 7:45am     Thanks.

## 2022-07-22 DIAGNOSIS — Z12.11 ENCOUNTER FOR SCREENING FOR MALIGNANT NEOPLASM OF COLON: Primary | ICD-10-CM

## 2022-07-22 RX ORDER — SODIUM, POTASSIUM,MAG SULFATES 17.5-3.13G
177 SOLUTION, RECONSTITUTED, ORAL ORAL TAKE AS DIRECTED
Qty: 177 ML | Refills: 0 | Status: SHIPPED | OUTPATIENT
Start: 2022-07-22

## 2022-09-09 ENCOUNTER — ANESTHESIA (OUTPATIENT)
Dept: GASTROENTEROLOGY | Facility: HOSPITAL | Age: 62
End: 2022-09-09

## 2022-09-09 ENCOUNTER — ANESTHESIA EVENT (OUTPATIENT)
Dept: GASTROENTEROLOGY | Facility: HOSPITAL | Age: 62
End: 2022-09-09

## 2022-09-09 ENCOUNTER — HOSPITAL ENCOUNTER (OUTPATIENT)
Facility: HOSPITAL | Age: 62
Setting detail: HOSPITAL OUTPATIENT SURGERY
Discharge: HOME | End: 2022-09-09
Attending: INTERNAL MEDICINE | Admitting: INTERNAL MEDICINE

## 2022-09-09 ENCOUNTER — TELEPHONE (OUTPATIENT)
Dept: GASTROENTEROLOGY | Facility: CLINIC | Age: 62
End: 2022-09-09

## 2022-09-09 VITALS
HEIGHT: 63 IN | WEIGHT: 109 LBS | BODY MASS INDEX: 19.31 KG/M2 | DIASTOLIC BLOOD PRESSURE: 73 MMHG | HEART RATE: 82 BPM | SYSTOLIC BLOOD PRESSURE: 100 MMHG | RESPIRATION RATE: 14 BRPM | TEMPERATURE: 98.2 F | OXYGEN SATURATION: 98 %

## 2022-09-09 DIAGNOSIS — Z12.11 ENCOUNTER FOR SCREENING FOR MALIGNANT NEOPLASM OF COLON: ICD-10-CM

## 2022-09-09 PROCEDURE — 25010000002 PROPOFOL 10 MG/ML EMULSION: Performed by: NURSE ANESTHETIST, CERTIFIED REGISTERED

## 2022-09-09 PROCEDURE — 45378 DIAGNOSTIC COLONOSCOPY: CPT | Performed by: INTERNAL MEDICINE

## 2022-09-09 RX ORDER — LIDOCAINE HYDROCHLORIDE 20 MG/ML
INJECTION, SOLUTION EPIDURAL; INFILTRATION; INTRACAUDAL; PERINEURAL AS NEEDED
Status: DISCONTINUED | OUTPATIENT
Start: 2022-09-09 | End: 2022-09-09 | Stop reason: SURG

## 2022-09-09 RX ORDER — SODIUM CHLORIDE 0.9 % (FLUSH) 0.9 %
10 SYRINGE (ML) INJECTION AS NEEDED
Status: CANCELLED | OUTPATIENT
Start: 2022-09-09

## 2022-09-09 RX ORDER — SODIUM CHLORIDE 9 MG/ML
100 INJECTION, SOLUTION INTRAVENOUS CONTINUOUS
Status: CANCELLED | OUTPATIENT
Start: 2022-09-09

## 2022-09-09 RX ORDER — SODIUM CHLORIDE 0.9 % (FLUSH) 0.9 %
10 SYRINGE (ML) INJECTION AS NEEDED
Status: DISCONTINUED | OUTPATIENT
Start: 2022-09-09 | End: 2022-09-09 | Stop reason: HOSPADM

## 2022-09-09 RX ORDER — PROPOFOL 10 MG/ML
VIAL (ML) INTRAVENOUS AS NEEDED
Status: DISCONTINUED | OUTPATIENT
Start: 2022-09-09 | End: 2022-09-09 | Stop reason: SURG

## 2022-09-09 RX ORDER — SODIUM CHLORIDE 0.9 % (FLUSH) 0.9 %
10 SYRINGE (ML) INJECTION EVERY 12 HOURS SCHEDULED
Status: CANCELLED | OUTPATIENT
Start: 2022-09-09

## 2022-09-09 RX ORDER — SODIUM CHLORIDE 9 MG/ML
500 INJECTION, SOLUTION INTRAVENOUS CONTINUOUS PRN
Status: DISCONTINUED | OUTPATIENT
Start: 2022-09-09 | End: 2022-09-09 | Stop reason: HOSPADM

## 2022-09-09 RX ADMIN — PROPOFOL 250 MG: 10 INJECTION, EMULSION INTRAVENOUS at 10:23

## 2022-09-09 RX ADMIN — SODIUM CHLORIDE 500 ML: 9 INJECTION, SOLUTION INTRAVENOUS at 09:11

## 2022-09-09 RX ADMIN — LIDOCAINE HYDROCHLORIDE 40 MG: 20 INJECTION, SOLUTION EPIDURAL; INFILTRATION; INTRACAUDAL; PERINEURAL at 10:23

## 2022-09-09 NOTE — ANESTHESIA PREPROCEDURE EVALUATION
Anesthesia Evaluation     Patient summary reviewed   no history of anesthetic complications:  NPO Solid Status: > 8 hours  NPO Liquid Status: > 8 hours           Airway   Mallampati: I  TM distance: >3 FB  Neck ROM: full  No difficulty expected  Dental      Pulmonary    (-) sleep apnea, not a smoker  Cardiovascular   Exercise tolerance: good (4-7 METS)    (-) hypertension, CAD      Neuro/Psych  (-) seizures, TIA, CVA  GI/Hepatic/Renal/Endo    (-) no renal disease, diabetes    Musculoskeletal     Abdominal    Substance History      OB/GYN          Other                          Anesthesia Plan    ASA 2     MAC     intravenous induction     Anesthetic plan, risks, benefits, and alternatives have been provided, discussed and informed consent has been obtained with: patient.

## 2022-09-09 NOTE — H&P
"[facility]  Pre Procedure History & Physical    Chief Complaint:   Screening    Subjective     HPI:   Screening    Past Medical History:   History reviewed. No pertinent past medical history.    Past Surgical History:  Past Surgical History:   Procedure Laterality Date   • AUGMENTATION MAMMAPLASTY Bilateral 30 yrs   • D & C HYSTEROSCOPY N/A 6/21/2021    Procedure: DILATATION AND CURETTAGE HYSTEROSCOPY;  Surgeon: Sita Watson MD;  Location: [facility];  Service: Gynecology;  Laterality: N/A;   • EYE SURGERY         Family History:  Family History   Problem Relation Age of Onset   • Cancer Mother    • Breast cancer Mother    • Diabetes Maternal Grandmother    • Colon cancer Neg Hx    • Colon polyps Neg Hx    • Esophageal cancer Neg Hx    • Ovarian cancer Neg Hx        Social History:   reports that she has never smoked. She has never used smokeless tobacco. She reports that she does not drink alcohol and does not use drugs.    Medications:   Prior to Admission medications    Medication Sig Start Date End Date Taking? Authorizing Provider   Multiple Vitamins-Minerals (HAIR SKIN AND NAILS FORMULA PO) Take  by mouth.   Yes ProviderRoland MD   ciclopirox (PENLAC) 8 % solution APPLY TO AFFECTED TOENAILS EVERY NIGHT 5/3/21   Provider, MD Roland   sodium-potassium-magnesium sulfates (Suprep Bowel Prep Kit) 17.5-3.13-1.6 GM/177ML solution oral solution Take 1 bottle by mouth Take As Directed. Take as directed 7/22/22   Stevan Llanes APRN   vitamin B-12 (CYANOCOBALAMIN) 1000 MCG tablet Take 1,000 mcg by mouth Daily.    Provider, MD Roland       Allergies:  Adhesive tape and Penicillins    ROS:    General: Weight stable  Resp: No SOA  Cardiovascular: No CP    Objective     Blood pressure 153/72, pulse 62, temperature 98.2 °F (36.8 °C), temperature source Temporal, resp. rate 18, height 160 cm (63\"), weight 49.4 kg (109 lb), last menstrual period 03/15/2019, SpO2 100 %, not " currently breastfeeding.    Physical Exam   Constitutional: Pt is oriented to person, place, and in no distress.   HENT: Mouth/Throat: Oropharynx is clear.   Cardiovascular: Normal rate, regular rhythm.    Pulmonary/Chest: Effort normal. No respiratory distress. No  wheezes.   Abdominal: Soft. Non-distended.  Skin: Skin is warm and dry.   Psychiatric: Mood, memory, affect and judgment appear normal.     Assessment & Plan     Diagnosis:  Screening    Anticipated Surgical Procedure:  C-scope    The risks, benefits, and alternatives of this procedure have been discussed with the patient or the responsible party- the patient understands and agrees to proceed.

## 2022-09-09 NOTE — ANESTHESIA POSTPROCEDURE EVALUATION
"Patient: Kyleigh SEGOVIA    Procedure Summary     Date: 09/09/22 Room / Location: [facility] ENDOSCOPY 2 / [facility]    Anesthesia Start: 1019 Anesthesia Stop: 1041    Procedure: COLONOSCOPY WITH ANESTHESIA (N/A ) Diagnosis:       Encounter for screening for malignant neoplasm of colon      (Encounter for screening for malignant neoplasm of colon [Z12.11])    Surgeons: Mitch Hightower DO Provider: Kristian Sandoval CRNA    Anesthesia Type: MAC ASA Status: 2          Anesthesia Type: MAC    Vitals  Vitals Value Taken Time   BP 84/75 09/09/22 1051   Temp     Pulse 76 09/09/22 1053   Resp 11 09/09/22 1042   SpO2 100 % 09/09/22 1053   Vitals shown include unvalidated device data.        Post Anesthesia Care and Evaluation    Patient location during evaluation: PACU  Patient participation: complete - patient participated  Level of consciousness: awake and alert  Pain management: adequate    Airway patency: patent  Anesthetic complications: No anesthetic complications    Cardiovascular status: acceptable  Respiratory status: acceptable  Hydration status: acceptable    Comments: Blood pressure 95/58, pulse 82, temperature 98.2 °F (36.8 °C), temperature source Temporal, resp. rate 11, height 160 cm (63\"), weight 49.4 kg (109 lb), last menstrual period 03/15/2019, SpO2 98 %, not currently breastfeeding.    Pt discharged from PACU based on dorene score >8      "

## (undated) DEVICE — SENSR O2 OXIMAX FNGR A/ 18IN NONSTR

## (undated) DEVICE — PAD D&C: Brand: MEDLINE INDUSTRIES, INC.

## (undated) DEVICE — GLV SURG SENSICARE W/ALOE PF LF SZ6 STRL

## (undated) DEVICE — TBG SMPL FLTR LINE NASL 02/C02 A/ BX/100

## (undated) DEVICE — YANKAUER,BULB TIP WITH VENT: Brand: ARGYLE

## (undated) DEVICE — CAP CONN RED

## (undated) DEVICE — CYSTO/BLADDER IRRIGATION SET, REGULATING CLAMP

## (undated) DEVICE — PK TURNOVER RM ADV

## (undated) DEVICE — Device: Brand: DEFENDO AIR/WATER/SUCTION AND BIOPSY VALVE

## (undated) DEVICE — MASK,OXYGEN,MED CONC,ADLT,7' TUB, UC: Brand: PENDING

## (undated) DEVICE — THE CHANNEL CLEANING BRUSH IS A NYLON FLEXI BRUSH ATTACHED TO A FLEXIBLE PLASTIC SHEATH DESIGNED TO SAFELY REMOVE DEBRIS FROM FLEXIBLE ENDOSCOPES.